# Patient Record
Sex: MALE | Race: WHITE | URBAN - METROPOLITAN AREA
[De-identification: names, ages, dates, MRNs, and addresses within clinical notes are randomized per-mention and may not be internally consistent; named-entity substitution may affect disease eponyms.]

---

## 2021-04-02 ENCOUNTER — BULK ORDERING (OUTPATIENT)
Dept: CASE MANAGEMENT | Facility: OTHER | Age: 46
End: 2021-04-02

## 2021-04-02 DIAGNOSIS — Z23 IMMUNIZATION DUE: ICD-10-CM

## 2021-04-05 ENCOUNTER — OFFICE VISIT (OUTPATIENT)
Dept: ENDOCRINOLOGY | Age: 46
End: 2021-04-05

## 2021-04-05 VITALS
WEIGHT: 248.3 LBS | DIASTOLIC BLOOD PRESSURE: 80 MMHG | SYSTOLIC BLOOD PRESSURE: 110 MMHG | BODY MASS INDEX: 35.55 KG/M2 | HEART RATE: 96 BPM | HEIGHT: 70 IN | OXYGEN SATURATION: 98 %

## 2021-04-05 DIAGNOSIS — IMO0002 DIABETES MELLITUS TYPE 2, UNCONTROLLED, WITH COMPLICATIONS: Primary | ICD-10-CM

## 2021-04-05 DIAGNOSIS — E66.01 MORBID OBESITY (HCC): ICD-10-CM

## 2021-04-05 DIAGNOSIS — E78.5 HYPERLIPIDEMIA, UNSPECIFIED HYPERLIPIDEMIA TYPE: ICD-10-CM

## 2021-04-05 PROCEDURE — 99204 OFFICE O/P NEW MOD 45 MIN: CPT | Performed by: INTERNAL MEDICINE

## 2021-04-05 RX ORDER — FLUOXETINE HYDROCHLORIDE 40 MG/1
40 CAPSULE ORAL DAILY
COMMUNITY
End: 2021-04-05

## 2021-04-05 RX ORDER — DULOXETIN HYDROCHLORIDE 60 MG/1
60 CAPSULE, DELAYED RELEASE ORAL DAILY
COMMUNITY

## 2021-04-05 RX ORDER — ASPIRIN 81 MG/1
TABLET ORAL
COMMUNITY

## 2021-04-05 RX ORDER — GEMFIBROZIL 600 MG/1
600 TABLET, FILM COATED ORAL
COMMUNITY
End: 2023-01-09 | Stop reason: ALTCHOICE

## 2021-04-05 RX ORDER — FERROUS SULFATE 325(65) MG
1 TABLET ORAL DAILY
COMMUNITY
Start: 2021-03-10

## 2021-04-05 RX ORDER — OMEPRAZOLE 40 MG/1
40 CAPSULE, DELAYED RELEASE ORAL DAILY
COMMUNITY

## 2021-04-05 RX ORDER — GLIPIZIDE 10 MG/1
10 TABLET ORAL
COMMUNITY
End: 2021-11-08 | Stop reason: SDUPTHER

## 2021-04-05 RX ORDER — LISINOPRIL 40 MG/1
40 TABLET ORAL DAILY
COMMUNITY

## 2021-04-05 RX ORDER — OMEPRAZOLE 40 MG/1
40 CAPSULE, DELAYED RELEASE ORAL EVERY MORNING
COMMUNITY
Start: 2021-03-13 | End: 2021-11-08 | Stop reason: SDUPTHER

## 2021-04-05 RX ORDER — VENLAFAXINE 75 MG/1
75 TABLET ORAL
COMMUNITY
End: 2021-04-05

## 2021-04-05 RX ORDER — ROSUVASTATIN CALCIUM 5 MG/1
5 TABLET, COATED ORAL DAILY
COMMUNITY
End: 2021-04-09

## 2021-04-05 RX ORDER — INSULIN GLARGINE 100 [IU]/ML
16 INJECTION, SOLUTION SUBCUTANEOUS 2 TIMES DAILY
COMMUNITY
Start: 2021-04-02 | End: 2021-04-29 | Stop reason: SDUPTHER

## 2021-04-05 RX ORDER — MELATONIN 10 MG
CAPSULE ORAL
COMMUNITY

## 2021-04-05 RX ORDER — LAMOTRIGINE 150 MG/1
150 TABLET ORAL DAILY
COMMUNITY

## 2021-04-05 RX ORDER — AMLODIPINE BESYLATE 5 MG/1
5 TABLET ORAL DAILY
COMMUNITY

## 2021-04-05 RX ORDER — BLOOD SUGAR DIAGNOSTIC
1 STRIP MISCELLANEOUS 3 TIMES DAILY
COMMUNITY
Start: 2021-01-31 | End: 2021-10-21 | Stop reason: SDUPTHER

## 2021-04-05 RX ORDER — ARIPIPRAZOLE 5 MG/1
5 TABLET ORAL DAILY
COMMUNITY
End: 2021-04-05

## 2021-04-05 RX ORDER — GLIPIZIDE 10 MG/1
10 TABLET, FILM COATED, EXTENDED RELEASE ORAL DAILY
COMMUNITY

## 2021-04-05 NOTE — PROGRESS NOTES
"Chief Complaint  Diabetes (testing bs 2 x day / last dm eye exam less then 1 yr ago )    Subjective          Gerard Gallagher presents to [facility] ENDOCRINOLOGY    45-year-old male with diabetes mellitus poorly controlled, hyperlipidemia, and obesity who presents to \[facility]\"" care.    Diabetes  Mr. Gallagher notes that he was first diagnosed with diabetes in 2007. He is taking Lantus 16 units twice daily, metformin 1000 mg twice daily , glipizide 10 mg po qd. Patient notes that he checks his blood sugars 3-4 times daily.  Patient notes fasting BG in the 200s. Notes that he urinates more than 2-3 times. He denies any changes in his weight. Last eye exam was less than 1 years ago. Patient notes that he has hx of diabetic retinopathy. He notes having cataract sugery for both eyes in 2019. No hx of MI/CVA. Numbness or tingling, or amputations. No hypoglycemia.  Patient is not sure of his last hemoglobin A1c.  Patient denies hypoglycemia        Hyperlipidemia  He takes Crestor 5 mg po qd and gemfibrizol  600mg once daily.  We currently do not have documentations of his last lipid levels.  He denies muscle cramps or arthralgias with Crestor and gemfibrozil on board.    Obesity  BMI 35.63.  Patient is 248 pounds.  Patient notes he has difficulty minimizing his carbohydrates.  Activity has been limited in the setting of COVID-19 pandemic.      Objective   Vital Signs:   /80 (BP Location: Right arm, Patient Position: Sitting, Cuff Size: Large Adult)   Pulse 96   Ht 177.8 cm (70\")   Wt 113 kg (248 lb 4.8 oz)   SpO2 98%   BMI 35.63 kg/m²     Physical Exam  Vitals reviewed.   Constitutional:       Appearance: Normal appearance. He is obese.   HENT:      Head: Normocephalic and atraumatic.      Nose:      Comments: Mask in place  Eyes:      Extraocular Movements: Extraocular movements intact.      Conjunctiva/sclera: Conjunctivae normal.      Pupils: Pupils are equal, round, and reactive to light.   Neck: "      Thyroid: Thyromegaly present. No thyroid mass or thyroid tenderness.   Cardiovascular:      Rate and Rhythm: Normal rate and regular rhythm.      Pulses: Normal pulses.           Dorsalis pedis pulses are 2+ on the right side and 2+ on the left side.      Heart sounds: Normal heart sounds.   Pulmonary:      Effort: Pulmonary effort is normal.      Breath sounds: Normal breath sounds.   Abdominal:      General: Abdomen is flat. Bowel sounds are normal.      Palpations: Abdomen is soft.      Comments: Pale abdominal striae   Feet:      Right foot:      Protective Sensation: 10 sites tested. 10 sites sensed.      Skin integrity: Skin integrity normal.      Toenail Condition: Right toenails are long.      Left foot:      Protective Sensation: 10 sites tested. 10 sites sensed.      Skin integrity: Skin integrity normal.      Toenail Condition: Left toenails are long.   Skin:     General: Skin is warm and dry.   Neurological:      General: No focal deficit present.      Mental Status: He is alert and oriented to person, place, and time.      Cranial Nerves: Cranial nerves are intact.      Sensory: Sensation is intact.      Deep Tendon Reflexes: Reflexes are normal and symmetric.   Psychiatric:         Mood and Affect: Mood normal.         Behavior: Behavior normal.        Result Review :   The following data was reviewed by: Joe Camarena MD on 04/05/2021:                                         Assessment and Plan    Diagnoses and all orders for this visit:    1. Diabetes mellitus type 2, uncontrolled, with complications (CMS/[facility]) (Primary)  Assessment & Plan:  Goal Hgb A1c <7  Goal fasting BG <130 mg/dl  Goal BG prior to meal <180  Previous hemoglobin A1c not documented  We will check labs today  Encourage low carb diet with no more than 45 gm of cab per meal  Patient notes fasting blood glucose levels in the 200s  As such we will increase Lantus from 16 units twice daily to 22 units twice daily  We will  continue Metformin 1000 mg twice daily we will also continue glipizide 10 mg p.o. daily  Make further modifications once labs results  Continue to encourage increase activity of 150 minutes of moderate exercise weekly    Orders:  -     Comprehensive Metabolic Panel  -     eGFR-Glomerular Filtration  -     Hemoglobin A1c  -     Lipid Panel  -     Microalbumin / Creatinine Urine Ratio - Urine, Clean Catch  -     Vitamin B12 & Folate  -     TSH  -     T4, Free    2. Hyperlipidemia, unspecified hyperlipidemia type  Assessment & Plan:  We will check a lipid panel today  Continue gemfibrozil 600 mg 1 tab daily and atorvastatin 5 mg 1 tab nightly pending labs      3. Morbid obesity (CMS/[facility])  Assessment & Plan:  Weight       04/05/21  1124   Weight: 113 kg (248 lb 4.8 oz)      BMI Body mass index is 35.63 kg/m².   Goal weight loss >/= 5% of TBW q 3 months  Encourage minimizing carbohydrates to less than 40 g of carb per meal  Encourage activity of 150 minutes of moderate activity per week  We will consider initiation of a GLP-1 agonist and an SGLT2 inhibitor which will also help with weight loss  Patient meets criteria for gastric bypass surgery  We will consider other weight loss medications first before recommending surgery          Follow Up   Return in about 3 months (around 7/5/2021).  Patient was given instructions and counseling regarding his condition or for health maintenance advice. Please see specific information pulled into the AVS if appropriate.

## 2021-04-05 NOTE — ASSESSMENT & PLAN NOTE
We will check a lipid panel today  Continue gemfibrozil 600 mg 1 tab daily and atorvastatin 5 mg 1 tab nightly pending labs

## 2021-04-05 NOTE — ASSESSMENT & PLAN NOTE
Weight       04/05/21  1124   Weight: 113 kg (248 lb 4.8 oz)      BMI Body mass index is 35.63 kg/m².   Goal weight loss >/= 5% of TBW q 3 months  Encourage minimizing carbohydrates to less than 40 g of carb per meal  Encourage activity of 150 minutes of moderate activity per week  We will consider initiation of a GLP-1 agonist and an SGLT2 inhibitor which will also help with weight loss  Patient meets criteria for gastric bypass surgery  We will consider other weight loss medications first before recommending surgery

## 2021-04-05 NOTE — ASSESSMENT & PLAN NOTE
Goal Hgb A1c <7  Goal fasting BG <130 mg/dl  Goal BG prior to meal <180  Previous hemoglobin A1c not documented  We will check labs today  Encourage low carb diet with no more than 45 gm of cab per meal  Patient notes fasting blood glucose levels in the 200s  As such we will increase Lantus from 16 units twice daily to 22 units twice daily  We will continue Metformin 1000 mg twice daily we will also continue glipizide 10 mg p.o. daily  Make further modifications once labs results  Continue to encourage increase activity of 150 minutes of moderate exercise weekly

## 2021-04-06 LAB
ALBUMIN SERPL-MCNC: 4.6 G/DL (ref 4–5)
ALBUMIN/CREAT UR: 4 MG/G CREAT (ref 0–29)
ALBUMIN/GLOB SERPL: 2 {RATIO} (ref 1.2–2.2)
ALP SERPL-CCNC: 76 IU/L (ref 39–117)
ALT SERPL-CCNC: 18 IU/L (ref 0–44)
AST SERPL-CCNC: 16 IU/L (ref 0–40)
BILIRUB SERPL-MCNC: 0.2 MG/DL (ref 0–1.2)
BUN SERPL-MCNC: 14 MG/DL (ref 6–24)
BUN/CREAT SERPL: 14 (ref 9–20)
CALCIUM SERPL-MCNC: 9.4 MG/DL (ref 8.7–10.2)
CHLORIDE SERPL-SCNC: 102 MMOL/L (ref 96–106)
CHOLEST SERPL-MCNC: 205 MG/DL (ref 100–199)
CO2 SERPL-SCNC: 22 MMOL/L (ref 20–29)
CREAT SERPL-MCNC: 1 MG/DL (ref 0.76–1.27)
CREAT UR-MCNC: 170.7 MG/DL
FOLATE SERPL-MCNC: 4 NG/ML
GLOBULIN SER CALC-MCNC: 2.3 G/DL (ref 1.5–4.5)
GLUCOSE SERPL-MCNC: 292 MG/DL (ref 65–99)
HBA1C MFR BLD: 11 % (ref 4.8–5.6)
HDLC SERPL-MCNC: 41 MG/DL
IMP & REVIEW OF LAB RESULTS: NORMAL
LDLC SERPL CALC-MCNC: 138 MG/DL (ref 0–99)
Lab: NORMAL
MICROALBUMIN UR-MCNC: 7.6 UG/ML
POTASSIUM SERPL-SCNC: 4.6 MMOL/L (ref 3.5–5.2)
PROT SERPL-MCNC: 6.9 G/DL (ref 6–8.5)
SODIUM SERPL-SCNC: 140 MMOL/L (ref 134–144)
T4 FREE SERPL-MCNC: 0.77 NG/DL (ref 0.82–1.77)
TRIGL SERPL-MCNC: 146 MG/DL (ref 0–149)
TSH SERPL DL<=0.005 MIU/L-ACNC: 1.85 UIU/ML (ref 0.45–4.5)
VIT B12 SERPL-MCNC: 381 PG/ML (ref 232–1245)
VLDLC SERPL CALC-MCNC: 26 MG/DL (ref 5–40)

## 2021-04-09 ENCOUNTER — TELEPHONE (OUTPATIENT)
Dept: ENDOCRINOLOGY | Age: 46
End: 2021-04-09

## 2021-04-09 RX ORDER — ROSUVASTATIN CALCIUM 10 MG/1
10 TABLET, COATED ORAL NIGHTLY
Qty: 30 TABLET | Refills: 11 | Status: SHIPPED | OUTPATIENT
Start: 2021-04-09 | End: 2021-11-09

## 2021-04-09 NOTE — TELEPHONE ENCOUNTER
Patient was called and notified that his hemoglobin A1c was above goal  Patient was also noted that his LDL was above goal  We will increase patient's Crestor to 10 mg 1 tab nightly  Patient is supposed to be taking Lantus 22 units twice daily secondary to our discussion post his first visit however patient is currently only taking Lantus 16 units twice daily  Concerned regarding his A1c that the patient may need mealtime insulin  We will try to initiate GLP-1 therapy first prior to deciding whether the patient should be started on fast acting insulin with meals.

## 2021-04-09 NOTE — TELEPHONE ENCOUNTER
Maty Harper MD   PCP - General, Internal Medicine   Since 4/5/2021   [phone]       Medical records requested from PCP

## 2021-04-09 NOTE — TELEPHONE ENCOUNTER
----- Message from Radha Camarena MD sent at 4/5/2021  6:43 PM EDT -----  Regarding: Labs  Please get the patien't most recent labs from His PCP     Maty Harper MD    PCP - General, Internal Medicine    Since 4/5/2021    [phone]

## 2021-04-29 RX ORDER — INSULIN GLARGINE 100 [IU]/ML
INJECTION, SOLUTION SUBCUTANEOUS
Qty: 30 ML | Refills: 3 | Status: SHIPPED | OUTPATIENT
Start: 2021-04-29 | End: 2021-10-21

## 2021-04-29 NOTE — TELEPHONE ENCOUNTER
Patient is needing refills on   Lantus SoloStar 100 UNIT/ML injection pen  (22 units twice daily)      Tresbia flextouch 100 unit/ml  (22 units once a day)       Sent to     [id number] - [place], [place] - [address] AT [facility] - [phone]  - [phone] FX   Phone:  [phone]   Fax:  [phone]

## 2021-10-08 RX ORDER — INSULIN GLARGINE 100 [IU]/ML
INJECTION, SOLUTION SUBCUTANEOUS
Qty: 30 ML | Refills: 3 | OUTPATIENT
Start: 2021-10-08

## 2021-10-21 ENCOUNTER — TELEPHONE (OUTPATIENT)
Dept: ENDOCRINOLOGY | Age: 46
End: 2021-10-21

## 2021-10-21 RX ORDER — BLOOD SUGAR DIAGNOSTIC
STRIP MISCELLANEOUS
Qty: 120 EACH | Refills: 0 | Status: SHIPPED | OUTPATIENT
Start: 2021-10-21 | End: 2021-10-25

## 2021-10-21 RX ORDER — INSULIN GLARGINE 100 [IU]/ML
INJECTION, SOLUTION SUBCUTANEOUS
Qty: 15 ML | Refills: 0 | Status: SHIPPED | OUTPATIENT
Start: 2021-10-21 | End: 2021-12-27

## 2021-10-21 RX ORDER — INSULIN GLARGINE 100 [IU]/ML
INJECTION, SOLUTION SUBCUTANEOUS
Qty: 15 ML | Refills: 0 | Status: SHIPPED | OUTPATIENT
Start: 2021-10-21 | End: 2021-10-21 | Stop reason: SDUPTHER

## 2021-10-21 NOTE — TELEPHONE ENCOUNTER
Patient called    Patient needs his Lantus and test strips refilled. He is almost out of the medications    [facility] [address]    Patient asks for a status update

## 2021-10-25 RX ORDER — BLOOD SUGAR DIAGNOSTIC
STRIP MISCELLANEOUS
Qty: 300 EACH | Refills: 3 | Status: SHIPPED | OUTPATIENT
Start: 2021-10-25

## 2021-11-08 ENCOUNTER — OFFICE VISIT (OUTPATIENT)
Dept: ENDOCRINOLOGY | Age: 46
End: 2021-11-08

## 2021-11-08 VITALS
SYSTOLIC BLOOD PRESSURE: 124 MMHG | RESPIRATION RATE: 20 BRPM | WEIGHT: 256.2 LBS | HEIGHT: 70 IN | OXYGEN SATURATION: 98 % | DIASTOLIC BLOOD PRESSURE: 76 MMHG | HEART RATE: 77 BPM | BODY MASS INDEX: 36.68 KG/M2

## 2021-11-08 DIAGNOSIS — IMO0002 DIABETES MELLITUS TYPE 2, UNCONTROLLED, WITH COMPLICATIONS: Primary | ICD-10-CM

## 2021-11-08 DIAGNOSIS — I10 ESSENTIAL HYPERTENSION: ICD-10-CM

## 2021-11-08 DIAGNOSIS — E78.5 HYPERLIPIDEMIA, UNSPECIFIED HYPERLIPIDEMIA TYPE: ICD-10-CM

## 2021-11-08 PROCEDURE — 99214 OFFICE O/P EST MOD 30 MIN: CPT | Performed by: NURSE PRACTITIONER

## 2021-11-08 RX ORDER — SEMAGLUTIDE 1.34 MG/ML
.25-.5 INJECTION, SOLUTION SUBCUTANEOUS WEEKLY
Qty: 4 PEN | Refills: 1 | Status: SHIPPED | OUTPATIENT
Start: 2021-11-08 | End: 2022-09-26

## 2021-11-08 NOTE — PROGRESS NOTES
"Chief Complaint  Chief Complaint   Patient presents with   • Diabetes     FUP DM2 CHYECKS BS DAILY AVG  LAST EYE EXAM 08/2021   • Short Stature       Subjective          History of Present Illness    Gerard Gallagher 46 y.o. presents for a follow-up evaluation for type 2 DM    He has been diabetic since 2007    Pt is on the following medications for their DM: glipizide 10 mg, Lantus 22 units BID and metformin 1,000 mg BID      Denies  diarrhea or constipation, difficulty breathing,chest pain, palpitations, vision changes, numbness and tingling in feet/hands.    Pt does have a history of DM retinopathy.  Last eye exam was 8/7/21  Bilateral cataract surgery in 2019.    Pt does not have a history of nephropathy.  Patient is currently taking ACE    Pt does not have neuropathy.     Pt does not have a history of CAD or CVA.    Last A1C in 04/21 was 11.0    Last microalbumin in 04/21 was negative      Blood Sugars    Blood glucoses are checked 1/day.    Fasting blood glucoses: 230s    Pt has no episodes of hypoglycemia.    Patient is not physical active.          Hyperlipidemia     Pt denies any muscle/body aches, chest pain, or shortness of breath    Pt is currently taking crestor 10 mg HS and gemfibrozil 600 mg daily    Last lipid panel in 04/21 showed Total 205, Triglycerides 146, HDL 41 and           Hypertension    Pt denies any chest pain, palpitations, shortness of breath, or headache    Current regimen includes norvasc 5 mg daily and lisinopril 40 mg daily          I have reviewed the patient's allergies, medicines, past medical hx, family hx and social hx.    Objective   Vital Signs:   /76 (BP Location: Left arm, Patient Position: Sitting, Cuff Size: Large Adult)   Pulse 77   Resp 20   Ht 177.8 cm (70\")   Wt 116 kg (256 lb 3.2 oz)   SpO2 98%   BMI 36.76 kg/m²       Physical Exam   Physical Exam  Constitutional:       General: He is not in acute distress.     Appearance: Normal appearance. He " is obese. He is not diaphoretic.   HENT:      Head: Normocephalic and atraumatic.   Eyes:      General:         Right eye: No discharge.         Left eye: No discharge.   Cardiovascular:      Rate and Rhythm: Normal rate and regular rhythm.      Heart sounds: Normal heart sounds. No murmur heard.  No friction rub. No gallop.    Pulmonary:      Effort: Pulmonary effort is normal. No respiratory distress.      Breath sounds: Normal breath sounds. No wheezing.   Skin:     General: Skin is warm and dry.   Neurological:      Mental Status: He is alert.   Psychiatric:         Mood and Affect: Mood normal.         Behavior: Behavior normal.                    Results Review:   Hemoglobin A1C   Date Value Ref Range Status   04/05/2021 11.0 (H) 4.8 - 5.6 % Final     Comment:              Prediabetes: 5.7 - 6.4           Diabetes: >6.4           Glycemic control for adults with diabetes: <7.0       Triglycerides   Date Value Ref Range Status   04/05/2021 146 0 - 149 mg/dL Final     HDL Cholesterol   Date Value Ref Range Status   04/05/2021 41 >39 mg/dL Final     LDL Chol Calc (NIH)   Date Value Ref Range Status   04/05/2021 138 (H) 0 - 99 mg/dL Final     VLDL Cholesterol Ruben   Date Value Ref Range Status   04/05/2021 26 5 - 40 mg/dL Final         Assessment and Plan {CC Problem List  Visit Diagnosis  ROS  Review (Popup)  Health Maintenance  Quality  BestPractice  Medications  SmartSets  SnapShot Encounters  Media :23  Diagnoses and all orders for this visit:    1. Diabetes mellitus type 2, uncontrolled, with complications (HCC) (Primary)  -     Comprehensive Metabolic Panel  -     Hemoglobin A1c  -     Semaglutide,0.25 or 0.5MG/DOS, (Ozempic, 0.25 or 0.5 MG/DOSE,) 2 MG/1.5ML solution pen-injector; Inject 0.25-0.5 mg under the skin into the appropriate area as directed 1 (One) Time Per Week. 0.25 mg once a week for 4 weeks then go to 0.5 mg once a week  Dispense: 4 pen; Refill: 1    Increase to 46 units daily of  Lanuts - would like to take daily instead of BID  Start GLP1  No history of pancreatitis and no personal or family history of thyroid cancer  Advised to increase BG checks - doesn't want CGM at this time      2. Hyperlipidemia, unspecified hyperlipidemia type  -     Comprehensive Metabolic Panel  -     Lipid Panel    Check labs today  Continue with statin and gemfibrozil      3. Essential hypertension  -     Comprehensive Metabolic Panel    Stable  Continue with current medication regimen  Monitor        No refills needed at this time    Doesn't use Mychart      RTC in 3 months      Follow Up     Patient was given instructions and counseling regarding her condition or for health maintenance advice. Please see specific information pulled into the AVS if appropriate.              Esme Amin, APRN  11/08/21

## 2021-11-09 DIAGNOSIS — E78.5 HYPERLIPIDEMIA, UNSPECIFIED HYPERLIPIDEMIA TYPE: Primary | ICD-10-CM

## 2021-11-09 LAB
ALBUMIN SERPL-MCNC: 4.7 G/DL (ref 4–5)
ALBUMIN/GLOB SERPL: 1.8 {RATIO} (ref 1.2–2.2)
ALP SERPL-CCNC: 91 IU/L (ref 44–121)
ALT SERPL-CCNC: 21 IU/L (ref 0–44)
AST SERPL-CCNC: 15 IU/L (ref 0–40)
BILIRUB SERPL-MCNC: 0.2 MG/DL (ref 0–1.2)
BUN SERPL-MCNC: 18 MG/DL (ref 6–24)
BUN/CREAT SERPL: 18 (ref 9–20)
CALCIUM SERPL-MCNC: 10.2 MG/DL (ref 8.7–10.2)
CHLORIDE SERPL-SCNC: 98 MMOL/L (ref 96–106)
CHOLEST SERPL-MCNC: 243 MG/DL (ref 100–199)
CO2 SERPL-SCNC: 21 MMOL/L (ref 20–29)
CREAT SERPL-MCNC: 0.98 MG/DL (ref 0.76–1.27)
GLOBULIN SER CALC-MCNC: 2.6 G/DL (ref 1.5–4.5)
GLUCOSE SERPL-MCNC: 260 MG/DL (ref 65–99)
HBA1C MFR BLD: 11.8 % (ref 4.8–5.6)
HDLC SERPL-MCNC: 37 MG/DL
IMP & REVIEW OF LAB RESULTS: NORMAL
LDLC SERPL CALC-MCNC: 160 MG/DL (ref 0–99)
POTASSIUM SERPL-SCNC: 4.5 MMOL/L (ref 3.5–5.2)
PROT SERPL-MCNC: 7.3 G/DL (ref 6–8.5)
SODIUM SERPL-SCNC: 137 MMOL/L (ref 134–144)
TRIGL SERPL-MCNC: 249 MG/DL (ref 0–149)
VLDLC SERPL CALC-MCNC: 46 MG/DL (ref 5–40)

## 2021-11-09 RX ORDER — ROSUVASTATIN CALCIUM 10 MG/1
20 TABLET, COATED ORAL NIGHTLY
Qty: 30 TABLET | Refills: 5 | Status: SHIPPED | OUTPATIENT
Start: 2021-11-09 | End: 2022-10-07 | Stop reason: DRUGHIGH

## 2021-11-09 NOTE — PROGRESS NOTES
Called and talked to patient-    A1C 11 - patient has increased lantus.  He has not started ozempic, but I just completed PA.  Advised that if he has any trouble getting it then to let me know.    Cholesterol is elevated, therefore we will increase crestor to 20 mg

## 2021-12-27 RX ORDER — INSULIN GLARGINE 100 [IU]/ML
INJECTION, SOLUTION SUBCUTANEOUS
Qty: 15 ML | Refills: 2 | Status: SHIPPED | OUTPATIENT
Start: 2021-12-27 | End: 2022-04-11

## 2022-04-11 RX ORDER — INSULIN GLARGINE 100 [IU]/ML
INJECTION, SOLUTION SUBCUTANEOUS
Qty: 45 ML | Refills: 1 | Status: SHIPPED | OUTPATIENT
Start: 2022-04-11 | End: 2022-11-01

## 2022-07-01 RX ORDER — SEMAGLUTIDE 1.34 MG/ML
INJECTION, SOLUTION SUBCUTANEOUS
Qty: 6 ML | OUTPATIENT
Start: 2022-07-01

## 2022-07-01 NOTE — TELEPHONE ENCOUNTER
Rx Refill Note  Requested Prescriptions     Pending Prescriptions Disp Refills    Ozempic, 0.25 or 0.5 MG/DOSE, 2 MG/1.5ML solution pen-injector [Pharmacy Med Name: OZEMPIC 0.25 OR 0.5MG/DOS 1X2MG PEN] 6 mL      Sig: INJECT 0.25- 0.5MG UNDER THE SKIN INTO THE APPROPRIATE AREA AS DIRECTED ONCE WEEKLY. 0.25 MG ONCE A WEEK FOR 4 WEEKS THEN GO TO 0.5MG ONCE WEEKLY      Last office visit with prescribing clinician: 11/8/2021      Next office visit with prescribing clinician: Visit date not found            Katie Palomares  07/01/22, 13:50 EDT

## 2022-07-19 RX ORDER — SEMAGLUTIDE 1.34 MG/ML
INJECTION, SOLUTION SUBCUTANEOUS
Qty: 6 ML | OUTPATIENT
Start: 2022-07-19

## 2022-07-19 NOTE — TELEPHONE ENCOUNTER
Rx Refill Note  Requested Prescriptions     Pending Prescriptions Disp Refills    Ozempic, 0.25 or 0.5 MG/DOSE, 2 MG/1.5ML solution pen-injector [Pharmacy Med Name: OZEMPIC 0.25 OR 0.5MG/DOS 1X2MG PEN] 6 mL      Sig: INJECT 0.25- 0.5MG UNDER THE SKIN INTO THE APPROPRIATE AREA AS DIRECTED ONCE WEEKLY. 0.25 MG ONCE A WEEK FOR 4 WEEKS THEN GO TO 0.5MG ONCE WEEKLY      Last office visit with prescribing clinician: 11/8/2021      Next office visit with prescribing clinician: Visit date not found            JAY HERNDON MA  07/19/22, 08:52 EDT

## 2022-09-26 RX ORDER — SEMAGLUTIDE 1.34 MG/ML
0.5 INJECTION, SOLUTION SUBCUTANEOUS WEEKLY
Qty: 6 ML | Refills: 0 | Status: SHIPPED | OUTPATIENT
Start: 2022-09-26 | End: 2022-10-26

## 2022-10-04 PROBLEM — E11.65 UNCONTROLLED TYPE 2 DIABETES MELLITUS WITH HYPERGLYCEMIA (HCC): Status: ACTIVE | Noted: 2021-04-05

## 2022-10-04 NOTE — PROGRESS NOTES
Chief Complaint  Chief Complaint   Patient presents with   • Diabetes     Type 2 Did not bring meter   BS this morning : 285  Lowest BS: 186  Highest BS: 285     Up to date on eye exam   Diabetic neuropathy/nephropathy: x       Subjective          History of Present Illness    Gerard Gallagher 47 y.o. presents for a follow-up evaluation for type 2 DM  Hasn't been seen since 11/2021     He has been diabetic since 2007     Pt is on the following medications for their DM: glipizide ER 10 mg daily, Lantus 46 units daily and  metformin 1,000 mg BID     Ozempic 0.25 mg  Weekly - just started back has taken 2 doses      Denies diarrhea, constipation, chest pain, shortness of breath, vision changes or numbness and tingling in feet/hands.    Weight stable since last visit.    Pt does have a history of DM retinopathy.  Last eye exam was 09/22  Bilateral cataract surgery in 2019.     Pt does not have a history of nephropathy.  Patient is currently taking ACE     Pt does not have neuropathy.      Pt does not have a history of CAD or CVA.    Last A1C in 11/21 was 11.8    Last microalbumin in 04/21 was negative          Blood Sugars    Blood glucoses are checked 1/day.    Fasting blood glucoses: 186-285    Pt has no episodes of hypoglycemia.      Refused CGM at 11/21 visit          Hyperlipidemia     Pt denies any muscle/body aches, chest pain, or shortness of breath    Pt is currently taking crestor 10 mg HS and gemfibrozil 600 mg daily    Last lipid panel in 11/21 showed Total 243, Triglycerides 249, HDL 37 and             Hypertension    Pt denies any chest pain, palpitations, shortness of breath, or headache    Current regimen includes norvasc 5 mg daily and lisinopril 40 mg daily            I have reviewed the patient's allergies, medicines, past medical hx, family hx and social hx.    Objective   Vital Signs:   /80 (BP Location: Left arm, Patient Position: Sitting, Cuff Size: Large Adult)   Pulse 100   Temp 96.2  "°F (35.7 °C) (Temporal)   Ht 177.8 cm (70\")   Wt 116 kg (255 lb 3.2 oz)   SpO2 98%   BMI 36.62 kg/m²       Physical Exam   Physical Exam  Constitutional:       General: He is not in acute distress.     Appearance: Normal appearance. He is not diaphoretic.   HENT:      Head: Normocephalic and atraumatic.   Eyes:      General:         Right eye: No discharge.         Left eye: No discharge.   Skin:     General: Skin is warm and dry.   Neurological:      Mental Status: He is alert.   Psychiatric:         Mood and Affect: Mood normal.         Behavior: Behavior normal.                    Results Review:   Hemoglobin A1C   Date Value Ref Range Status   11/08/2021 11.8 (H) 4.8 - 5.6 % Final     Comment:              Prediabetes: 5.7 - 6.4           Diabetes: >6.4           Glycemic control for adults with diabetes: <7.0       Triglycerides   Date Value Ref Range Status   11/08/2021 249 (H) 0 - 149 mg/dL Final     HDL Cholesterol   Date Value Ref Range Status   11/08/2021 37 (L) >39 mg/dL Final     LDL Chol Calc (NIH)   Date Value Ref Range Status   11/08/2021 160 (H) 0 - 99 mg/dL Final     VLDL Cholesterol Ruben   Date Value Ref Range Status   11/08/2021 46 (H) 5 - 40 mg/dL Final         Assessment and Plan {CC Problem List  Visit Diagnosis  ROS  Review (Popup)  Health Maintenance  Quality  BestPractice  Medications  SmartSets  SnapShot Encounters  Media :23  Diagnoses and all orders for this visit:    1. Uncontrolled type 2 diabetes mellitus with hyperglycemia (HCC) (Primary)  -     Hemoglobin A1c  -     Comprehensive Metabolic Panel  -     Microalbumin / Creatinine Urine Ratio - Urine, Clean Catch    Increase Lantus 52 units daily   Continue with glipizide ER 10 mg daily  Continue with metformin 1,000 mg BID   Ozempic 0.25 mg  Weekly for a total of 4 weeks then go to 0.5 mg weekly  Check blood sugars in the morning when you get up and then again during the day  Check labs      2. Hyperlipidemia, " unspecified hyperlipidemia type  -     Comprehensive Metabolic Panel  -     Lipid Panel    Check labs today  Continue with statin and gemfibrozil      3. Essential hypertension  -     Comprehensive Metabolic Panel    Manual recheck was 124/80  Continue with current medication regimen  Defer management to PCP          No refills needed at this time      Labs today  RTC in 3 months with me      Follow Up     Patient was given instructions and counseling regarding her condition or for health maintenance advice. Please see specific information pulled into the AVS if appropriate.              Esme Amin, CATA  10/06/22

## 2022-10-06 ENCOUNTER — OFFICE VISIT (OUTPATIENT)
Dept: ENDOCRINOLOGY | Age: 47
End: 2022-10-06

## 2022-10-06 VITALS
TEMPERATURE: 96.2 F | SYSTOLIC BLOOD PRESSURE: 124 MMHG | BODY MASS INDEX: 36.54 KG/M2 | OXYGEN SATURATION: 98 % | HEIGHT: 70 IN | WEIGHT: 255.2 LBS | DIASTOLIC BLOOD PRESSURE: 80 MMHG | HEART RATE: 100 BPM

## 2022-10-06 DIAGNOSIS — E11.65 UNCONTROLLED TYPE 2 DIABETES MELLITUS WITH HYPERGLYCEMIA: Primary | ICD-10-CM

## 2022-10-06 DIAGNOSIS — I10 ESSENTIAL HYPERTENSION: ICD-10-CM

## 2022-10-06 DIAGNOSIS — E78.5 HYPERLIPIDEMIA, UNSPECIFIED HYPERLIPIDEMIA TYPE: ICD-10-CM

## 2022-10-06 PROCEDURE — 99214 OFFICE O/P EST MOD 30 MIN: CPT | Performed by: NURSE PRACTITIONER

## 2022-10-06 RX ORDER — BUPROPION HYDROCHLORIDE 150 MG/1
TABLET, EXTENDED RELEASE ORAL
COMMUNITY
Start: 2022-09-09

## 2022-10-06 NOTE — PATIENT INSTRUCTIONS
Increase Lantus 52 units daily   Continue with glipizide ER 10 mg daily  Continue with metformin 1,000 mg BID   Ozempic 0.25 mg  Weekly for a total of 4 weeks then go to 0.5 mg weekly  Check blood sugars in the morning when you get up and then again during the day

## 2022-10-07 DIAGNOSIS — E11.65 UNCONTROLLED TYPE 2 DIABETES MELLITUS WITH HYPERGLYCEMIA: Primary | ICD-10-CM

## 2022-10-07 DIAGNOSIS — E78.5 HYPERLIPIDEMIA, UNSPECIFIED HYPERLIPIDEMIA TYPE: ICD-10-CM

## 2022-10-07 LAB
ALBUMIN SERPL-MCNC: 4.9 G/DL (ref 3.5–5.2)
ALBUMIN/CREAT UR: 4 MG/G CREAT (ref 0–29)
ALBUMIN/GLOB SERPL: 2.7 G/DL
ALP SERPL-CCNC: 93 U/L (ref 39–117)
ALT SERPL-CCNC: 18 U/L (ref 1–41)
AST SERPL-CCNC: 11 U/L (ref 1–40)
BILIRUB SERPL-MCNC: <0.2 MG/DL (ref 0–1.2)
BUN SERPL-MCNC: 15 MG/DL (ref 6–20)
BUN/CREAT SERPL: 14.4 (ref 7–25)
CALCIUM SERPL-MCNC: 9.8 MG/DL (ref 8.6–10.5)
CHLORIDE SERPL-SCNC: 97 MMOL/L (ref 98–107)
CHOLEST SERPL-MCNC: 230 MG/DL (ref 0–200)
CO2 SERPL-SCNC: 25.4 MMOL/L (ref 22–29)
CREAT SERPL-MCNC: 1.04 MG/DL (ref 0.76–1.27)
CREAT UR-MCNC: 74 MG/DL
EGFRCR SERPLBLD CKD-EPI 2021: 88 ML/MIN/1.73
GLOBULIN SER CALC-MCNC: 1.8 GM/DL
GLUCOSE SERPL-MCNC: 362 MG/DL (ref 65–99)
HBA1C MFR BLD: 11.4 % (ref 4.8–5.6)
HDLC SERPL-MCNC: 44 MG/DL (ref 40–60)
IMP & REVIEW OF LAB RESULTS: NORMAL
LDLC SERPL CALC-MCNC: 125 MG/DL (ref 0–100)
MICROALBUMIN UR-MCNC: 3.2 UG/ML
POTASSIUM SERPL-SCNC: 4.9 MMOL/L (ref 3.5–5.2)
PROT SERPL-MCNC: 6.7 G/DL (ref 6–8.5)
SODIUM SERPL-SCNC: 139 MMOL/L (ref 136–145)
TRIGL SERPL-MCNC: 344 MG/DL (ref 0–150)
VLDLC SERPL CALC-MCNC: 61 MG/DL (ref 5–40)

## 2022-10-07 RX ORDER — INSULIN ASPART 100 [IU]/ML
3 INJECTION, SOLUTION INTRAVENOUS; SUBCUTANEOUS
Qty: 3 ML | Refills: 2 | Status: SHIPPED | OUTPATIENT
Start: 2022-10-07

## 2022-10-07 RX ORDER — ROSUVASTATIN CALCIUM 40 MG/1
40 TABLET, COATED ORAL NIGHTLY
Qty: 90 TABLET | Refills: 1 | Status: SHIPPED | OUTPATIENT
Start: 2022-10-07

## 2022-10-27 ENCOUNTER — TELEPHONE (OUTPATIENT)
Dept: ENDOCRINOLOGY | Age: 47
End: 2022-10-27

## 2022-11-01 RX ORDER — INSULIN GLARGINE 100 [IU]/ML
INJECTION, SOLUTION SUBCUTANEOUS
Qty: 45 ML | Refills: 1 | Status: SHIPPED | OUTPATIENT
Start: 2022-11-01

## 2023-01-06 ENCOUNTER — OFFICE VISIT (OUTPATIENT)
Dept: ENDOCRINOLOGY | Age: 48
End: 2023-01-06
Payer: COMMERCIAL

## 2023-01-06 VITALS
HEART RATE: 114 BPM | WEIGHT: 248.2 LBS | DIASTOLIC BLOOD PRESSURE: 90 MMHG | SYSTOLIC BLOOD PRESSURE: 130 MMHG | OXYGEN SATURATION: 98 % | HEIGHT: 70 IN | BODY MASS INDEX: 35.53 KG/M2 | TEMPERATURE: 96.2 F

## 2023-01-06 DIAGNOSIS — E11.65 UNCONTROLLED TYPE 2 DIABETES MELLITUS WITH HYPERGLYCEMIA: Primary | ICD-10-CM

## 2023-01-06 DIAGNOSIS — E78.5 HYPERLIPIDEMIA, UNSPECIFIED HYPERLIPIDEMIA TYPE: ICD-10-CM

## 2023-01-06 DIAGNOSIS — I10 ESSENTIAL HYPERTENSION: ICD-10-CM

## 2023-01-06 LAB
ALBUMIN SERPL-MCNC: 5.1 G/DL (ref 3.5–5.2)
ALBUMIN/GLOB SERPL: 2.4 G/DL
ALP SERPL-CCNC: 88 U/L (ref 39–117)
ALT SERPL-CCNC: 14 U/L (ref 1–41)
AST SERPL-CCNC: 13 U/L (ref 1–40)
BILIRUB SERPL-MCNC: 0.3 MG/DL (ref 0–1.2)
BUN SERPL-MCNC: 17 MG/DL (ref 6–20)
BUN/CREAT SERPL: 16.2 (ref 7–25)
CALCIUM SERPL-MCNC: 10.3 MG/DL (ref 8.6–10.5)
CHLORIDE SERPL-SCNC: 98 MMOL/L (ref 98–107)
CHOLEST SERPL-MCNC: 308 MG/DL (ref 0–200)
CO2 SERPL-SCNC: 26.4 MMOL/L (ref 22–29)
CREAT SERPL-MCNC: 1.05 MG/DL (ref 0.76–1.27)
EGFRCR SERPLBLD CKD-EPI 2021: 87 ML/MIN/1.73
GLOBULIN SER CALC-MCNC: 2.1 GM/DL
GLUCOSE SERPL-MCNC: 273 MG/DL (ref 65–99)
HBA1C MFR BLD: 9 % (ref 4.8–5.6)
HDLC SERPL-MCNC: 41 MG/DL (ref 40–60)
IMP & REVIEW OF LAB RESULTS: NORMAL
LDLC SERPL CALC-MCNC: 221 MG/DL (ref 0–100)
POTASSIUM SERPL-SCNC: 4.8 MMOL/L (ref 3.5–5.2)
PROT SERPL-MCNC: 7.2 G/DL (ref 6–8.5)
SODIUM SERPL-SCNC: 138 MMOL/L (ref 136–145)
TRIGL SERPL-MCNC: 229 MG/DL (ref 0–150)
VLDLC SERPL CALC-MCNC: 46 MG/DL (ref 5–40)

## 2023-01-06 PROCEDURE — 99214 OFFICE O/P EST MOD 30 MIN: CPT | Performed by: NURSE PRACTITIONER

## 2023-01-06 RX ORDER — SEMAGLUTIDE 1.34 MG/ML
INJECTION, SOLUTION SUBCUTANEOUS
COMMUNITY
Start: 2022-12-08 | End: 2023-01-06 | Stop reason: DRUGHIGH

## 2023-01-06 RX ORDER — SEMAGLUTIDE 1.34 MG/ML
1 INJECTION, SOLUTION SUBCUTANEOUS WEEKLY
Qty: 9 ML | Refills: 1 | Status: SHIPPED | OUTPATIENT
Start: 2023-01-06

## 2023-01-06 NOTE — PATIENT INSTRUCTIONS
Continue with  glipizide ER 10 mg daily and metformin 1,000 mg BID  Increase Lantus 56 units daily,   Need to start taking Novolog 3 units before each meal  Increase Ozempic 1 mg  Weekly  Check blood sugars in the morning when you get up, before lunch and before dinner - bring to next appointment

## 2023-01-06 NOTE — PROGRESS NOTES
Chief Complaint  Chief Complaint   Patient presents with   • Diabetes     Type 2: Pt doesn't have meter, doesn't check bs regularly, is up to date on eye exam, no hx of retinopathy or neuropathy.        Subjective          History of Present Illness    Gerard Gallagher 47 y.o. presents for a follow-up evaluation for type 2 DM  Hasn't been seen since 11/2021     He has been diabetic since 2007     Pt is on the following medications for their DM: glipizide ER 10 mg daily, Lantus 52 units daily, Novolog 3 units before each meal, metformin 1,000 mg BID  and Ozempic 0.5 mg  Weekly     Miss Novolog almost daily      Denies diarrhea, constipation, chest pain, shortness of breath, vision changes or numbness and tingling in feet/hands.    Weight gain of 23 lbs since last visit.    Pt does have a history of DM retinopathy.  Last eye exam was 09/22  Bilateral cataract surgery in 2019.     Pt does not have a history of nephropathy.  Patient is currently taking ACE     Pt does not have neuropathy.      Pt does not have a history of CAD or CVA.    Last A1C in 10/22 was 11.40    Last microalbumin in 10/22 was negative          Blood Sugars    Blood glucoses are checked not really checked.    Fasting blood glucoses: 197, 160    Pt has no episodes of hypoglycemia.            Hyperlipidemia     Pt denies any muscle/body aches, chest pain, or shortness of breath    Pt is currently taking crestor 40 mg HS and gemfibrozil 600 mg daily    Last lipid panel in 10/22 showed Total 230, Triglycerides 344, HDL 44 and             Hypertension    Pt denies any chest pain, palpitations, shortness of breath, or headache    Current regimen includes norvasc 5 mg daily and lisinopril 40 mg daily            I have reviewed the patient's allergies, medicines, past medical hx, family hx and social hx.    Objective   Vital Signs:   /90   Pulse 114   Temp 96.2 °F (35.7 °C) (Temporal)   Ht 177.8 cm (70\")   Wt 113 kg (248 lb 3.2 oz)   SpO2 98%    BMI 35.61 kg/m²       Physical Exam   Physical Exam  Constitutional:       General: He is not in acute distress.     Appearance: Normal appearance. He is obese. He is not diaphoretic.   HENT:      Head: Normocephalic and atraumatic.   Eyes:      General:         Right eye: No discharge.         Left eye: No discharge.   Skin:     General: Skin is warm and dry.   Neurological:      Mental Status: He is alert.   Psychiatric:         Mood and Affect: Mood normal.         Behavior: Behavior normal.                    Results Review:   Hemoglobin A1C   Date Value Ref Range Status   10/06/2022 11.40 (H) 4.80 - 5.60 % Final     Comment:     Hemoglobin A1C Ranges:  Increased Risk for Diabetes  5.7% to 6.4%  Diabetes                     >= 6.5%  Diabetic Goal                < 7.0%       Triglycerides   Date Value Ref Range Status   10/06/2022 344 (H) 0 - 150 mg/dL Final     HDL Cholesterol   Date Value Ref Range Status   10/06/2022 44 40 - 60 mg/dL Final     LDL Chol Calc (NIH)   Date Value Ref Range Status   10/06/2022 125 (H) 0 - 100 mg/dL Final     VLDL Cholesterol Ruben   Date Value Ref Range Status   10/06/2022 61 (H) 5 - 40 mg/dL Final         Assessment and Plan {CC Problem List  Visit Diagnosis  ROS  Review (Popup)  Health Maintenance  Quality  BestPractice  Medications  SmartSets  SnapShot Encounters  Media :23  Diagnoses and all orders for this visit:    1. Uncontrolled type 2 diabetes mellitus with hyperglycemia (HCC) (Primary)  -     Hemoglobin A1c  -     Comprehensive Metabolic Panel  -     Semaglutide, 1 MG/DOSE, (Ozempic, 1 MG/DOSE,) 4 MG/3ML solution pen-injector; Inject 1 mg under the skin into the appropriate area as directed 1 (One) Time Per Week.  Dispense: 9 mL; Refill: 1    Continue with  glipizide ER 10 mg daily and metformin 1,000 mg BID  Increase Lantus 56 units daily,   Need to start taking Novolog 3 units before each meal - advised to leave it out on the table were he eats to help  remind him.  He states that he doesn't eat at the same time every therefore alarms would not help.  Increase Ozempic 1 mg  Weekly  Check blood sugars in the morning when you get up, before lunch and before dinner - bring to next appointment - talked about CGMs and patient is willing to try and chose Dexcom  Check labs today        2. Hyperlipidemia, unspecified hyperlipidemia type  -     Comprehensive Metabolic Panel  -     Lipid Panel    Check labs today  Continue with statin and gemfibrozil      3. Essential hypertension  -     Comprehensive Metabolic Panel    Continue with current medication regimen  Defer management to PCP          No refills needed at this time      Labs today  RTC in 3 months with me      Follow Up     Patient was given instructions and counseling regarding her condition or for health maintenance advice. Please see specific information pulled into the AVS if appropriate.              Esme Amin, CATA  01/06/23

## 2023-01-09 DIAGNOSIS — E78.5 HYPERLIPIDEMIA, UNSPECIFIED HYPERLIPIDEMIA TYPE: Primary | ICD-10-CM

## 2023-01-09 RX ORDER — EZETIMIBE 10 MG/1
10 TABLET ORAL DAILY
Qty: 90 TABLET | Refills: 1 | Status: SHIPPED | OUTPATIENT
Start: 2023-01-09

## 2023-04-19 RX ORDER — INSULIN GLARGINE 100 [IU]/ML
56 INJECTION, SOLUTION SUBCUTANEOUS DAILY
Qty: 51 ML | Refills: 1 | Status: SHIPPED | OUTPATIENT
Start: 2023-04-19 | End: 2023-07-18

## 2023-04-28 RX ORDER — INSULIN GLARGINE 100 [IU]/ML
56 INJECTION, SOLUTION SUBCUTANEOUS DAILY
Qty: 51 ML | Refills: 1 | Status: SHIPPED | OUTPATIENT
Start: 2023-04-28

## 2023-05-01 RX ORDER — BLOOD SUGAR DIAGNOSTIC
STRIP MISCELLANEOUS
Qty: 300 EACH | Refills: 3 | Status: SHIPPED | OUTPATIENT
Start: 2023-05-01

## 2023-05-02 ENCOUNTER — OFFICE VISIT (OUTPATIENT)
Dept: ENDOCRINOLOGY | Age: 48
End: 2023-05-02
Payer: COMMERCIAL

## 2023-05-02 VITALS
BODY MASS INDEX: 35.13 KG/M2 | TEMPERATURE: 97.1 F | HEART RATE: 93 BPM | WEIGHT: 245.4 LBS | OXYGEN SATURATION: 94 % | SYSTOLIC BLOOD PRESSURE: 120 MMHG | DIASTOLIC BLOOD PRESSURE: 80 MMHG | HEIGHT: 70 IN

## 2023-05-02 DIAGNOSIS — E78.5 HYPERLIPIDEMIA, UNSPECIFIED HYPERLIPIDEMIA TYPE: ICD-10-CM

## 2023-05-02 DIAGNOSIS — E11.65 UNCONTROLLED TYPE 2 DIABETES MELLITUS WITH HYPERGLYCEMIA: Primary | ICD-10-CM

## 2023-05-02 DIAGNOSIS — I10 ESSENTIAL HYPERTENSION: ICD-10-CM

## 2023-05-02 RX ORDER — INSULIN GLARGINE 100 [IU]/ML
56 INJECTION, SOLUTION SUBCUTANEOUS DAILY
Qty: 51 ML | Refills: 1 | Status: SHIPPED | OUTPATIENT
Start: 2023-05-02

## 2023-05-02 RX ORDER — ATENOLOL 25 MG/1
TABLET ORAL
COMMUNITY
Start: 2023-04-19

## 2023-05-02 NOTE — PROGRESS NOTES
Chief Complaint  Chief Complaint   Patient presents with   • Uncontrolled type 2 diabetes mellitus with hyperglycemia     Pt doesn't have his meter with him today but does have his readings states that he checks his bs once daily (am) he has no hx of retinopathy, with a mild case of neuropathy in his feet        Subjective          History of Present Illness    Gerard Gallagher 47 y.o. presents for a follow-up evaluation for type 2 DM     He has been diabetic since 2007     Pt is on the following medications for their DM: glipizide ER 10 mg daily, Lantus 56 units daily, metformin 1,000 mg twice daily and Ozempic 1 mg  Weekly     Has been out of Lantus for a couple of weeks       Not taking Novolog      Denies diarrhea, constipation, chest pain, shortness of breath, vision changes or numbness and tingling in feet/hands.    Weight loss of 3 lbs since last visit.    Pt does have a history of DM retinopathy.  Last eye exam was 09/22  Bilateral cataract surgery in 2019.     Pt does not have a history of nephropathy.  Patient is currently taking ACE     Pt does not have neuropathy.      Pt does not have a history of CAD or CVA.    Last A1C in 01/23 was 9.00    Last microalbumin in 10/22 was negative          Blood Sugars    Blood glucoses are checked 1/day.    Fasting blood glucoses: 90s - mid 100s; off lantus mid 100s - 200s    Pt has no episodes of hypoglycemia.    CGM was too expensive            Hyperlipidemia     Pt denies any muscle/body aches, chest pain or shortness of breath    Pt is currently taking Crestor 40 mg HS and zetia 10 mg daily    Last lipid panel in 01/23 showed Total 308, HDL 41,  and Triglycerides 229              Hypertension    Pt denies any chest pain, palpitations, shortness of breath or headache    Current regimen includes amlodipine 10 mg daily, atenolol 25 mg daily and lisinopril 40 mg daily            I have reviewed the patient's allergies, medicines, past medical hx, family hx and  "social hx.    Objective   Vital Signs:   /80   Pulse 93   Temp 97.1 °F (36.2 °C) (Temporal)   Ht 177.8 cm (70\")   Wt 111 kg (245 lb 6.4 oz)   SpO2 94%   BMI 35.21 kg/m²       Physical Exam   Physical Exam  Constitutional:       General: He is not in acute distress.     Appearance: Normal appearance. He is not diaphoretic.   HENT:      Head: Normocephalic and atraumatic.   Eyes:      General:         Right eye: No discharge.         Left eye: No discharge.   Skin:     General: Skin is warm and dry.   Neurological:      Mental Status: He is alert.   Psychiatric:         Mood and Affect: Mood normal.         Behavior: Behavior normal.                    Results Review:   Hemoglobin A1C   Date Value Ref Range Status   01/06/2023 9.00 (H) 4.80 - 5.60 % Final     Comment:     Hemoglobin A1C Ranges:  Increased Risk for Diabetes  5.7% to 6.4%  Diabetes                     >= 6.5%  Diabetic Goal                < 7.0%       Triglycerides   Date Value Ref Range Status   01/06/2023 229 (H) 0 - 150 mg/dL Final     HDL Cholesterol   Date Value Ref Range Status   01/06/2023 41 40 - 60 mg/dL Final     LDL Chol Calc (NIH)   Date Value Ref Range Status   01/06/2023 221 (H) 0 - 100 mg/dL Final     VLDL Cholesterol Ruben   Date Value Ref Range Status   01/06/2023 46 (H) 5 - 40 mg/dL Final         Assessment and Plan {CC Problem List  Visit Diagnosis  ROS  Review (Popup)  Health Maintenance  Quality  BestPractice  Medications  SmartSets  SnapShot Encounters  Media :23  Diagnoses and all orders for this visit:    1. Uncontrolled type 2 diabetes mellitus with hyperglycemia (HCC) (Primary)  -     Insulin Glargine (Lantus SoloStar) 100 UNIT/ML injection pen; Inject 56 Units under the skin into the appropriate area as directed Daily.  Dispense: 51 mL; Refill: 1  -     Hemoglobin A1c  -     Comprehensive Metabolic Panel  -     Microalbumin / Creatinine Urine Ratio - Urine, Clean Catch  -     TSH Rfx On Abnormal To Free " T4    Continue glipizide ER 10 mg daily, Lantus 56 units daily, metformin 1,000 mg twice daily and Ozempic 1 mg  Weekly  Stop Novolog - not been taking  Check blood sugar in the morning when you get up and again either before lunch or before dinner  Check labs  CGM was too expensive      2. Hyperlipidemia, unspecified hyperlipidemia type  -     Comprehensive Metabolic Panel  -     Lipid Panel    Check labs today  Continue with statin and zetia  If still elevated then will need to look into PSCK9 inhibitors        3. Essential hypertension  -     Comprehensive Metabolic Panel    Stable  Continue with current medication regimen  Defer management to PCP          Refills sent to pharmacy      Labs today  RTC in 3-4 months with me      Follow Up     Patient was given instructions and counseling regarding her condition or for health maintenance advice. Please see specific information pulled into the AVS if appropriate.              Esme Amin, CATA  05/02/23

## 2023-05-02 NOTE — PATIENT INSTRUCTIONS
Continue glipizide ER 10 mg daily, Lantus 56 units daily, metformin 1,000 mg twice daily and Ozempic 1 mg  Weekly  Stop Novolog  Check blood sugar in the morning when you get up and again either before lunch or before dinner

## 2023-05-03 LAB
ALBUMIN SERPL-MCNC: 4.8 G/DL (ref 3.5–5.2)
ALBUMIN/CREAT UR: 8 MG/G CREAT (ref 0–29)
ALBUMIN/GLOB SERPL: 2.2 G/DL
ALP SERPL-CCNC: 93 U/L (ref 39–117)
ALT SERPL-CCNC: 16 U/L (ref 1–41)
AST SERPL-CCNC: 16 U/L (ref 1–40)
BILIRUB SERPL-MCNC: 0.4 MG/DL (ref 0–1.2)
BUN SERPL-MCNC: 14 MG/DL (ref 6–20)
BUN/CREAT SERPL: 15.1 (ref 7–25)
CALCIUM SERPL-MCNC: 10.4 MG/DL (ref 8.6–10.5)
CHLORIDE SERPL-SCNC: 99 MMOL/L (ref 98–107)
CHOLEST SERPL-MCNC: 122 MG/DL (ref 0–200)
CO2 SERPL-SCNC: 26.3 MMOL/L (ref 22–29)
CREAT SERPL-MCNC: 0.93 MG/DL (ref 0.76–1.27)
CREAT UR-MCNC: 102.4 MG/DL
EGFRCR SERPLBLD CKD-EPI 2021: 100.7 ML/MIN/1.73
GLOBULIN SER CALC-MCNC: 2.2 GM/DL
GLUCOSE SERPL-MCNC: 152 MG/DL (ref 65–99)
HBA1C MFR BLD: 7.8 % (ref 4.8–5.6)
HDLC SERPL-MCNC: 35 MG/DL (ref 40–60)
IMP & REVIEW OF LAB RESULTS: NORMAL
LDLC SERPL CALC-MCNC: 58 MG/DL (ref 0–100)
MICROALBUMIN UR-MCNC: 8.3 UG/ML
POTASSIUM SERPL-SCNC: 4.5 MMOL/L (ref 3.5–5.2)
PROT SERPL-MCNC: 7 G/DL (ref 6–8.5)
SODIUM SERPL-SCNC: 138 MMOL/L (ref 136–145)
TRIGL SERPL-MCNC: 169 MG/DL (ref 0–150)
TSH SERPL DL<=0.005 MIU/L-ACNC: 2.21 UIU/ML (ref 0.27–4.2)
VLDLC SERPL CALC-MCNC: 29 MG/DL (ref 5–40)

## 2023-08-23 DIAGNOSIS — E78.5 HYPERLIPIDEMIA, UNSPECIFIED HYPERLIPIDEMIA TYPE: ICD-10-CM

## 2023-08-23 NOTE — TELEPHONE ENCOUNTER
Rx Refill Note  Requested Prescriptions     Pending Prescriptions Disp Refills    rosuvastatin (CRESTOR) 40 MG tablet [Pharmacy Med Name: ROSUVASTATIN 40MG TABLETS] 90 tablet 1     Sig: TAKE 1 TABLET BY MOUTH EVERY NIGHT      Last office visit with prescribing clinician: 5/2/2023   Last telemedicine visit with prescribing clinician: Visit date not found   Next office visit with prescribing clinician: 9/25/2023                         Would you like a call back once the refill request has been completed: [] Yes [] No    If the office needs to give you a call back, can they leave a voicemail: [] Yes [] No    Jaye Kelly  08/23/23, 14:01 EDT

## 2023-08-24 RX ORDER — ROSUVASTATIN CALCIUM 40 MG/1
40 TABLET, COATED ORAL NIGHTLY
Qty: 90 TABLET | Refills: 1 | Status: SHIPPED | OUTPATIENT
Start: 2023-08-24

## 2023-08-29 DIAGNOSIS — E11.65 UNCONTROLLED TYPE 2 DIABETES MELLITUS WITH HYPERGLYCEMIA: ICD-10-CM

## 2023-08-29 RX ORDER — SEMAGLUTIDE 1.34 MG/ML
INJECTION, SOLUTION SUBCUTANEOUS
Qty: 9 ML | Refills: 1 | Status: SHIPPED | OUTPATIENT
Start: 2023-08-29

## 2023-08-29 NOTE — TELEPHONE ENCOUNTER
Rx Refill Note  Requested Prescriptions     Pending Prescriptions Disp Refills    Ozempic, 1 MG/DOSE, 4 MG/3ML solution pen-injector [Pharmacy Med Name: OZEMPIC 1MG PER DOSE (1X4MG PEN)] 9 mL 1     Sig: INJECT 1 MG UNDER THE SKIN INTO THE APPROPRIATE AREA AS DIRECTED ONCE WEEKLY      Last office visit with prescribing clinician: 5/2/2023   Last telemedicine visit with prescribing clinician: Visit date not found   Next office visit with prescribing clinician: 9/25/2023                         Would you like a call back once the refill request has been completed: [] Yes [] No    If the office needs to give you a call back, can they leave a voicemail: [] Yes [] No    Jaye Kelly  08/29/23, 12:07 EDT

## 2023-12-22 ENCOUNTER — PRIOR AUTHORIZATION (OUTPATIENT)
Dept: ENDOCRINOLOGY | Age: 48
End: 2023-12-22
Payer: COMMERCIAL

## 2024-01-15 ENCOUNTER — PRIOR AUTHORIZATION (OUTPATIENT)
Dept: ENDOCRINOLOGY | Age: 49
End: 2024-01-15
Payer: COMMERCIAL

## 2024-01-16 DIAGNOSIS — E78.5 HYPERLIPIDEMIA, UNSPECIFIED HYPERLIPIDEMIA TYPE: ICD-10-CM

## 2024-01-16 RX ORDER — EZETIMIBE 10 MG/1
10 TABLET ORAL DAILY
Qty: 90 TABLET | Refills: 1 | OUTPATIENT
Start: 2024-01-16

## 2024-01-16 NOTE — TELEPHONE ENCOUNTER
Rx Refill Note  Requested Prescriptions     Pending Prescriptions Disp Refills    ezetimibe (ZETIA) 10 MG tablet [Pharmacy Med Name: EZETIMIBE 10MG TABLETS] 90 tablet 1     Sig: TAKE 1 TABLET BY MOUTH DAILY. STOP GEMFIBROZIL AND. START EZETIMIBE 10 MG DAILY      Last office visit with prescribing clinician: 5/2/2023   Last telemedicine visit with prescribing clinician: Visit date not found   Next office visit with prescribing clinician: Visit date not found                         Would you like a call back once the refill request has been completed: [] Yes [] No    If the office needs to give you a call back, can they leave a voicemail: [] Yes [] No    Malena Kelly MA  01/16/24, 08:20 EST

## 2024-03-15 DIAGNOSIS — E11.65 UNCONTROLLED TYPE 2 DIABETES MELLITUS WITH HYPERGLYCEMIA: ICD-10-CM

## 2024-03-15 RX ORDER — SEMAGLUTIDE 1.34 MG/ML
INJECTION, SOLUTION SUBCUTANEOUS
Qty: 9 ML | Refills: 1 | OUTPATIENT
Start: 2024-03-15

## 2024-03-15 NOTE — TELEPHONE ENCOUNTER
Rx Refill Note  Requested Prescriptions     Pending Prescriptions Disp Refills    Ozempic, 1 MG/DOSE, 4 MG/3ML solution pen-injector [Pharmacy Med Name: OZEMPIC 1MG PER DOSE (4MG/3ML) PFP] 9 mL 1     Sig: INJECT 1 MG UNDER THE SKIN INTO THE APPROPRIATE AREA AS DIRECTED ONCE WEEKLY      Last office visit with prescribing clinician: 5/2/2023   Last telemedicine visit with prescribing clinician: Visit date not found   Next office visit with prescribing clinician: Visit date not found                         Would you like a call back once the refill request has been completed: [] Yes [] No    If the office needs to give you a call back, can they leave a voicemail: [] Yes [] No    Jaye Kelly  03/15/24, 07:40 EDT

## 2024-04-26 ENCOUNTER — OFFICE VISIT (OUTPATIENT)
Dept: ENDOCRINOLOGY | Age: 49
End: 2024-04-26
Payer: COMMERCIAL

## 2024-04-26 VITALS
OXYGEN SATURATION: 98 % | DIASTOLIC BLOOD PRESSURE: 80 MMHG | WEIGHT: 257.6 LBS | SYSTOLIC BLOOD PRESSURE: 126 MMHG | HEART RATE: 89 BPM | TEMPERATURE: 98 F | BODY MASS INDEX: 36.88 KG/M2 | HEIGHT: 70 IN

## 2024-04-26 DIAGNOSIS — E11.65 UNCONTROLLED TYPE 2 DIABETES MELLITUS WITH HYPERGLYCEMIA: Primary | ICD-10-CM

## 2024-04-26 DIAGNOSIS — E78.5 HYPERLIPIDEMIA, UNSPECIFIED HYPERLIPIDEMIA TYPE: ICD-10-CM

## 2024-04-26 DIAGNOSIS — I10 ESSENTIAL HYPERTENSION: ICD-10-CM

## 2024-04-26 RX ORDER — SEMAGLUTIDE 0.68 MG/ML
.25-.5 INJECTION, SOLUTION SUBCUTANEOUS WEEKLY
Qty: 3 ML | Refills: 1 | Status: SHIPPED | OUTPATIENT
Start: 2024-04-26

## 2024-04-26 NOTE — PATIENT INSTRUCTIONS
Continue with glipizide ER 10 mg daily  Continue with Lantus 56 units daily   Continue with metformin 1,000 mg daily  Restart Ozempic 0.25 mg once a week for 4 week and then increase to 0.5 mg weekly  Check blood sugars daily

## 2024-04-26 NOTE — PROGRESS NOTES
"Chief Complaint  Chief Complaint   Patient presents with    Diabetes       Subjective          History of Present Illness    Gerard Gallagher 48 y.o.  presents for a follow-up evaluation for type 2 DM    Last seen in office since 05/02/23     He has been diabetic since 2007     Pt is on the following medications for their DM: glipizide ER 10 mg daily, Lantus 56 units daily and metformin 1,000 mg daily    Ozempic 1 mg  Weekly - off for about a 1-2 months      Novolog stopped 05/23 visit due to no longer needing      Denies diarrhea, constipation, chest pain, shortness of breath, vision changes or numbness and tingling in feet/hands.    Pt denies a history of diabetic retinopathy.  Last eye exam was 09/22    Pt does not have nephropathy.  Patient is currently taking ACE    Pt denies neuropathy.    Pt denies a history of CAD or CVA.    Last A1C in 05/23 was 7.8    Last microalbumin in 05/23 was negative          Blood Sugars    Blood glucoses are checked 1/day    Fasting blood glucoses: 130s    Pt has no episodes of hypoglycemia.        CGM was too expensive           Hyperlipidemia     Pt is currently taking Crestor 40 mg HS and zetia 10 mg daily     Last lipid panel in 05/23 showed Total 122, HDL 35, LDL 58 and Triglycerides 169            Hypertension    Pt denies any chest pain, palpitations, shortness of breath or headache    Current regimen includes amlodipine 10 mg daily, atenolol 25 mg daily and lisinopril 40 mg daily             I have reviewed the patient's allergies, medicines, past medical hx, family hx and social hx.    Objective   Vital Signs:   /80 (BP Location: Left arm, Patient Position: Sitting)   Pulse 89   Temp 98 °F (36.7 °C) (Oral)   Ht 177.8 cm (70\")   Wt 117 kg (257 lb 9.6 oz)   SpO2 98%   BMI 36.96 kg/m²       Physical Exam   Physical Exam  Constitutional:       General: He is not in acute distress.     Appearance: Normal appearance. He is obese. He is not diaphoretic.   HENT:      " Head: Normocephalic and atraumatic.   Eyes:      General:         Right eye: No discharge.         Left eye: No discharge.   Skin:     General: Skin is warm and dry.   Neurological:      Mental Status: He is alert.   Psychiatric:         Mood and Affect: Mood normal.         Behavior: Behavior normal.                    Results Review:   Hemoglobin A1C   Date Value Ref Range Status   05/02/2023 7.80 (H) 4.80 - 5.60 % Final     Comment:     Hemoglobin A1C Ranges:  Increased Risk for Diabetes  5.7% to 6.4%  Diabetes                     >= 6.5%  Diabetic Goal                < 7.0%       Triglycerides   Date Value Ref Range Status   05/02/2023 169 (H) 0 - 150 mg/dL Final     HDL Cholesterol   Date Value Ref Range Status   05/02/2023 35 (L) 40 - 60 mg/dL Final     LDL Chol Calc (NIH)   Date Value Ref Range Status   05/02/2023 58 0 - 100 mg/dL Final     VLDL Cholesterol Ruben   Date Value Ref Range Status   05/02/2023 29 5 - 40 mg/dL Final         Assessment and Plan {CC Problem List  Visit Diagnosis  ROS  Review (Popup)  Health Maintenance  Quality  BestPractice  Medications  SmartSets  SnapShot Encounters  Media :23  Diagnoses and all orders for this visit:    1. Uncontrolled type 2 diabetes mellitus with hyperglycemia (Primary)  -     Hemoglobin A1c; Future  -     Comprehensive Metabolic Panel; Future  -     Microalbumin / Creatinine Urine Ratio - Urine, Clean Catch; Future  -     Semaglutide,0.25 or 0.5MG/DOS, (Ozempic, 0.25 or 0.5 MG/DOSE,) 2 MG/3ML solution pen-injector; Inject 0.25-0.5 mg under the skin into the appropriate area as directed 1 (One) Time Per Week. Inject 0.25 mg weekly for four weeks, then increase to 0.5 mg weekly  Dispense: 3 mL; Refill: 1    Check labs next week  Continue with glipizide ER 10 mg daily  Continue with Lantus 56 units daily   Continue with metformin 1,000 mg daily - states that when he took twice a day he had hypoglycemia  Been off Ozempic for at least a month, maybe  2  Restart Ozempic 0.25 mg once a week for 4 week and then increase to 0.5 mg weekly  Check blood sugars daily      2. Hyperlipidemia, unspecified hyperlipidemia type  -     Comprehensive Metabolic Panel; Future  -     Lipid Panel; Future    Check labs next week  Continue with statin and Zetia      3. Essential hypertension  -     Comprehensive Metabolic Panel; Future    Stable  Continue with current medication regimen  Defer management to PCP           Labs next week  RTC in 4 months with me      Follow Up     Patient was given instructions and counseling regarding her condition or for health maintenance advice. Please see specific information pulled into the AVS if appropriate.                Esme Amin, CATA  04/26/24

## 2024-04-29 ENCOUNTER — PRIOR AUTHORIZATION (OUTPATIENT)
Dept: ENDOCRINOLOGY | Age: 49
End: 2024-04-29
Payer: COMMERCIAL

## 2024-04-29 NOTE — TELEPHONE ENCOUNTER
A PA for Ozempic (0.25 or 0.5 MG/DOSE) 2MG/3ML pen-injectors has been started.    (Key: [id number])  Rx #: [id number]

## 2024-04-29 NOTE — TELEPHONE ENCOUNTER
This medication or product was previously approved on [id number] from 2023-12-22 to 2024-12-22. **Please note: This request was submitted electronically. Formulary lowering, tiering exception, cost reduction and/or pre-benefit determination review (including prospective Medicare hospice reviews) requests cannot be requested using this method of submission. Providers contact us at [phone] for further assistance.

## 2024-04-30 DIAGNOSIS — E78.5 HYPERLIPIDEMIA, UNSPECIFIED HYPERLIPIDEMIA TYPE: ICD-10-CM

## 2024-04-30 RX ORDER — ROSUVASTATIN CALCIUM 40 MG/1
40 TABLET, COATED ORAL NIGHTLY
Qty: 90 TABLET | Refills: 1 | Status: SHIPPED | OUTPATIENT
Start: 2024-04-30

## 2024-04-30 NOTE — TELEPHONE ENCOUNTER
Rx Refill Note  Requested Prescriptions     Pending Prescriptions Disp Refills    rosuvastatin (CRESTOR) 40 MG tablet [Pharmacy Med Name: ROSUVASTATIN 40MG TABLETS] 90 tablet 1     Sig: TAKE 1 TABLET BY MOUTH EVERY NIGHT      Last office visit with prescribing clinician: 4/26/2024   Last telemedicine visit with prescribing clinician: Visit date not found   Next office visit with prescribing clinician: 8/26/2024                         Would you like a call back once the refill request has been completed: [] Yes [] No    If the office needs to give you a call back, can they leave a voicemail: [] Yes [] No    Jaye Kelly  04/30/24, 11:04 EDT

## 2024-05-06 ENCOUNTER — LAB (OUTPATIENT)
Dept: ENDOCRINOLOGY | Age: 49
End: 2024-05-06
Payer: COMMERCIAL

## 2024-05-06 DIAGNOSIS — E78.5 HYPERLIPIDEMIA, UNSPECIFIED HYPERLIPIDEMIA TYPE: ICD-10-CM

## 2024-05-06 DIAGNOSIS — E11.65 UNCONTROLLED TYPE 2 DIABETES MELLITUS WITH HYPERGLYCEMIA: ICD-10-CM

## 2024-05-06 DIAGNOSIS — I10 ESSENTIAL HYPERTENSION: ICD-10-CM

## 2024-05-07 DIAGNOSIS — E78.5 HYPERLIPIDEMIA, UNSPECIFIED HYPERLIPIDEMIA TYPE: ICD-10-CM

## 2024-05-07 LAB
ALBUMIN SERPL-MCNC: 4.8 G/DL (ref 3.5–5.2)
ALBUMIN/CREAT UR: 9 MG/G CREAT (ref 0–29)
ALBUMIN/GLOB SERPL: 1.9 G/DL
ALP SERPL-CCNC: 87 U/L (ref 39–117)
ALT SERPL-CCNC: 42 U/L (ref 1–41)
AST SERPL-CCNC: 29 U/L (ref 1–40)
BILIRUB SERPL-MCNC: 0.4 MG/DL (ref 0–1.2)
BUN SERPL-MCNC: 13 MG/DL (ref 6–20)
BUN/CREAT SERPL: 11.4 (ref 7–25)
CALCIUM SERPL-MCNC: 9.9 MG/DL (ref 8.6–10.5)
CHLORIDE SERPL-SCNC: 100 MMOL/L (ref 98–107)
CHOLEST SERPL-MCNC: 232 MG/DL (ref 0–200)
CO2 SERPL-SCNC: 24.6 MMOL/L (ref 22–29)
CREAT SERPL-MCNC: 1.14 MG/DL (ref 0.76–1.27)
CREAT UR-MCNC: 289.5 MG/DL
EGFRCR SERPLBLD CKD-EPI 2021: 78.4 ML/MIN/1.73
GLOBULIN SER CALC-MCNC: 2.5 GM/DL
GLUCOSE SERPL-MCNC: 231 MG/DL (ref 65–99)
HBA1C MFR BLD: 8.3 % (ref 4.8–5.6)
HDLC SERPL-MCNC: 42 MG/DL (ref 40–60)
IMP & REVIEW OF LAB RESULTS: NORMAL
LDLC SERPL CALC-MCNC: 128 MG/DL (ref 0–100)
MICROALBUMIN UR-MCNC: 27.1 UG/ML
POTASSIUM SERPL-SCNC: 4.8 MMOL/L (ref 3.5–5.2)
PROT SERPL-MCNC: 7.3 G/DL (ref 6–8.5)
SODIUM SERPL-SCNC: 139 MMOL/L (ref 136–145)
TRIGL SERPL-MCNC: 345 MG/DL (ref 0–150)
VLDLC SERPL CALC-MCNC: 62 MG/DL (ref 5–40)

## 2024-05-07 RX ORDER — EZETIMIBE 10 MG/1
10 TABLET ORAL DAILY
Qty: 90 TABLET | Refills: 1 | Status: SHIPPED | OUTPATIENT
Start: 2024-05-07

## 2024-05-09 DIAGNOSIS — E11.65 UNCONTROLLED TYPE 2 DIABETES MELLITUS WITH HYPERGLYCEMIA: ICD-10-CM

## 2024-05-10 RX ORDER — INSULIN GLARGINE 100 [IU]/ML
56 INJECTION, SOLUTION SUBCUTANEOUS DAILY
Qty: 51 ML | Refills: 1 | Status: SHIPPED | OUTPATIENT
Start: 2024-05-10

## 2024-05-13 ENCOUNTER — PRIOR AUTHORIZATION (OUTPATIENT)
Dept: ENDOCRINOLOGY | Age: 49
End: 2024-05-13
Payer: COMMERCIAL

## 2024-05-13 NOTE — TELEPHONE ENCOUNTER
A PA has been started for Ozempic (0.25 or 0.5 MG/DOSE) 2MG/3ML pen-injectors  (Key: [id number])    Message from Plan  This medication or product was previously approved on [id number] from 2023-12-22 to 2024-12-22. **Please note: This request was submitted electronically. Formulary lowering, tiering exception, cost reduction and/or pre-benefit determination review (including prospective Medicare hospice reviews) requests cannot be requested using this method of submission. Providers contact us at [phone] for further assistance.

## 2024-07-31 DIAGNOSIS — E11.65 UNCONTROLLED TYPE 2 DIABETES MELLITUS WITH HYPERGLYCEMIA: ICD-10-CM

## 2024-07-31 NOTE — TELEPHONE ENCOUNTER
Rx Refill Note  Requested Prescriptions     Pending Prescriptions Disp Refills    Ozempic, 0.25 or 0.5 MG/DOSE, 2 MG/3ML solution pen-injector [Pharmacy Med Name: OZEMPIC 0.25 OR 0.5MG/[id number] 3ML] 3 mL 1     Sig: INJECT 0.25 MG UNDER THE SKIN ONCE PER WEEK FOR 4 WEEKS THEN 0.5 MG UNDER THE SKIN ONCE PER WEEK      Last office visit with prescribing clinician: 4/26/2024   Last telemedicine visit with prescribing clinician: Visit date not found   Next office visit with prescribing clinician: 8/26/2024                         Would you like a call back once the refill request has been completed: [] Yes [] No    If the office needs to give you a call back, can they leave a voicemail: [] Yes [] No    Jaye Kelly  07/31/24, 14:41 EDT

## 2024-08-01 RX ORDER — SEMAGLUTIDE 0.68 MG/ML
0.5 INJECTION, SOLUTION SUBCUTANEOUS WEEKLY
Qty: 3 ML | Refills: 3 | Status: SHIPPED | OUTPATIENT
Start: 2024-08-01

## 2024-09-23 RX ORDER — GLIPIZIDE 10 MG/1
10 TABLET, FILM COATED, EXTENDED RELEASE ORAL DAILY
Qty: 90 TABLET | Refills: 1 | Status: SHIPPED | OUTPATIENT
Start: 2024-09-23

## 2024-10-30 NOTE — TELEPHONE ENCOUNTER
Caller: MARIA EUGENIA VARGAS    Relationship: SELF    Best call back number: [phone]    Requested Prescriptions: PATIENT IS OUT OF MEDICATION  Requested Prescriptions     Pending Prescriptions Disp Refills    metFORMIN (GLUCOPHAGE) 1000 MG tablet       Sig: Take 1 tablet by mouth Daily With Breakfast.        [id number] [place], [place] - [address] AT [facility] [phone] [facility] [phone] FX     Last office visit with prescribing clinician: 4/26/2024   Last telemedicine visit with prescribing clinician: Visit date not found   Next office visit with prescribing clinician: 11/1/2024     Additional details provided by patient:     Does the patient have less than a 3 day supply:  [] Yes  [] No    Would you like a call back once the refill request has been completed: [] Yes [] No    If the office needs to give you a call back, can they leave a voicemail: [] Yes [] No    Xavier Smith Rep   10/30/24 16:10 EDT

## 2024-12-03 DIAGNOSIS — E11.65 UNCONTROLLED TYPE 2 DIABETES MELLITUS WITH HYPERGLYCEMIA: ICD-10-CM

## 2024-12-03 RX ORDER — SEMAGLUTIDE 0.68 MG/ML
INJECTION, SOLUTION SUBCUTANEOUS
Qty: 3 ML | Refills: 3 | OUTPATIENT
Start: 2024-12-03

## 2024-12-03 NOTE — TELEPHONE ENCOUNTER
Rx Refill Note  Requested Prescriptions     Pending Prescriptions Disp Refills    Ozempic, 0.25 or 0.5 MG/DOSE, 2 MG/3ML solution pen-injector [Pharmacy Med Name: OZEMPIC 0.25-0.5 MG/DOSE(2 MG/3 ML)] 3 mL 3     Sig: DIAL AND INJECT UNDER THE SKIN 0.5 MG WEEKLY AS DIRECTED      Last office visit with prescribing clinician: 4/26/2024   Last telemedicine visit with prescribing clinician: Visit date not found   Next office visit with prescribing clinician: 12/5/2024                         Would you like a call back once the refill request has been completed: [] Yes [] No    If the office needs to give you a call back, can they leave a voicemail: [] Yes [] No    Jaye Kelly  12/03/24, 09:34 EST

## 2024-12-08 DIAGNOSIS — E11.65 UNCONTROLLED TYPE 2 DIABETES MELLITUS WITH HYPERGLYCEMIA: ICD-10-CM

## 2024-12-09 DIAGNOSIS — E11.65 UNCONTROLLED TYPE 2 DIABETES MELLITUS WITH HYPERGLYCEMIA: ICD-10-CM

## 2024-12-09 RX ORDER — SEMAGLUTIDE 0.68 MG/ML
INJECTION, SOLUTION SUBCUTANEOUS
Qty: 3 ML | Refills: 3 | OUTPATIENT
Start: 2024-12-09

## 2024-12-09 NOTE — TELEPHONE ENCOUNTER
Pt informed that he will need to get his refills when he is seen on the 12th and had no further questions.

## 2024-12-09 NOTE — TELEPHONE ENCOUNTER
Rx Refill Note  Requested Prescriptions     Pending Prescriptions Disp Refills    Ozempic, 0.25 or 0.5 MG/DOSE, 2 MG/3ML solution pen-injector [Pharmacy Med Name: OZEMPIC 0.25-0.5 MG/DOSE(2 MG/3 ML)] 3 mL 3     Sig: DIAL AND INJECT UNDER THE SKIN 0.5 MG WEEKLY AS DIRECTED      Last office visit with prescribing clinician: 4/26/2024   Last telemedicine visit with prescribing clinician: Visit date not found   Next office visit with prescribing clinician: 2/5/2025                         Would you like a call back once the refill request has been completed: [] Yes [] No    If the office needs to give you a call back, can they leave a voicemail: [] Yes [] No    Jaye Kelly  12/09/24, 07:32 EST

## 2024-12-09 NOTE — TELEPHONE ENCOUNTER
Rx Refill Note  Requested Prescriptions     Pending Prescriptions Disp Refills    Ozempic, 0.25 or 0.5 MG/DOSE, 2 MG/3ML solution pen-injector [Pharmacy Med Name: OZEMPIC 0.25-0.5 MG/DOSE(2 MG/3 ML)] 3 mL 3     Sig: DIAL AND INJECT UNDER THE SKIN 0.5 MG WEEKLY AS DIRECTED      Last office visit with prescribing clinician: 4/26/2024   Last telemedicine visit with prescribing clinician: Visit date not found   Next office visit with prescribing clinician: 12/12/2024                         Would you like a call back once the refill request has been completed: [] Yes [] No    If the office needs to give you a call back, can they leave a voicemail: [] Yes [] No    Jaye Kelly  12/09/24, 12:59 EST

## 2024-12-09 NOTE — TELEPHONE ENCOUNTER
Patient was returning Jaye's call. He said we can leave a detailed message on his phone if we do not answer

## 2024-12-16 ENCOUNTER — TELEPHONE (OUTPATIENT)
Dept: ENDOCRINOLOGY | Age: 49
End: 2024-12-16
Payer: COMMERCIAL

## 2024-12-16 NOTE — TELEPHONE ENCOUNTER
Called pt to confirm appt for tomorrow. Was able to speak with pt and confirmed appt for tomorrow. Pt had no further questions or concerns.

## 2024-12-17 ENCOUNTER — OFFICE VISIT (OUTPATIENT)
Dept: ENDOCRINOLOGY | Age: 49
End: 2024-12-17
Payer: COMMERCIAL

## 2024-12-17 VITALS
BODY MASS INDEX: 34.39 KG/M2 | HEART RATE: 90 BPM | DIASTOLIC BLOOD PRESSURE: 84 MMHG | HEIGHT: 70 IN | WEIGHT: 240.2 LBS | SYSTOLIC BLOOD PRESSURE: 144 MMHG | TEMPERATURE: 98 F | OXYGEN SATURATION: 95 %

## 2024-12-17 DIAGNOSIS — Z91.199 NONCOMPLIANCE: ICD-10-CM

## 2024-12-17 DIAGNOSIS — I10 ESSENTIAL HYPERTENSION: ICD-10-CM

## 2024-12-17 DIAGNOSIS — E78.5 HYPERLIPIDEMIA, UNSPECIFIED HYPERLIPIDEMIA TYPE: ICD-10-CM

## 2024-12-17 DIAGNOSIS — E11.65 UNCONTROLLED TYPE 2 DIABETES MELLITUS WITH HYPERGLYCEMIA: Primary | ICD-10-CM

## 2024-12-17 LAB
ALBUMIN SERPL-MCNC: 4.3 G/DL (ref 3.5–5.2)
ALBUMIN/GLOB SERPL: 1.7 G/DL
ALP SERPL-CCNC: 86 U/L (ref 39–117)
ALT SERPL-CCNC: 24 U/L (ref 1–41)
AST SERPL-CCNC: 20 U/L (ref 1–40)
BILIRUB SERPL-MCNC: 0.3 MG/DL (ref 0–1.2)
BUN SERPL-MCNC: 10 MG/DL (ref 6–20)
BUN/CREAT SERPL: 10.3 (ref 7–25)
CALCIUM SERPL-MCNC: 9.4 MG/DL (ref 8.6–10.5)
CHLORIDE SERPL-SCNC: 101 MMOL/L (ref 98–107)
CHOLEST SERPL-MCNC: 305 MG/DL (ref 0–200)
CO2 SERPL-SCNC: 28.9 MMOL/L (ref 22–29)
CREAT SERPL-MCNC: 0.97 MG/DL (ref 0.76–1.27)
EGFRCR SERPLBLD CKD-EPI 2021: 94.5 ML/MIN/1.73
GLOBULIN SER CALC-MCNC: 2.5 GM/DL
GLUCOSE SERPL-MCNC: 145 MG/DL (ref 65–99)
HBA1C MFR BLD: 9.9 % (ref 4.8–5.6)
HDLC SERPL-MCNC: 41 MG/DL (ref 40–60)
IMP & REVIEW OF LAB RESULTS: NORMAL
LDLC SERPL CALC-MCNC: 205 MG/DL (ref 0–100)
POTASSIUM SERPL-SCNC: 4.4 MMOL/L (ref 3.5–5.2)
PROT SERPL-MCNC: 6.8 G/DL (ref 6–8.5)
SODIUM SERPL-SCNC: 141 MMOL/L (ref 136–145)
TRIGL SERPL-MCNC: 292 MG/DL (ref 0–150)
VLDLC SERPL CALC-MCNC: 59 MG/DL (ref 5–40)

## 2024-12-17 RX ORDER — EZETIMIBE 10 MG/1
10 TABLET ORAL DAILY
Qty: 90 TABLET | Refills: 1 | Status: SHIPPED | OUTPATIENT
Start: 2024-12-17

## 2024-12-17 RX ORDER — SEMAGLUTIDE 0.68 MG/ML
0.5 INJECTION, SOLUTION SUBCUTANEOUS WEEKLY
Qty: 3 ML | Refills: 3 | Status: SHIPPED | OUTPATIENT
Start: 2024-12-17

## 2024-12-17 RX ORDER — ROSUVASTATIN CALCIUM 40 MG/1
40 TABLET, COATED ORAL NIGHTLY
Qty: 90 TABLET | Refills: 1 | Status: SHIPPED | OUTPATIENT
Start: 2024-12-17

## 2024-12-17 NOTE — PATIENT INSTRUCTIONS
Continue with glipizide ER 10 mg daily, Lantus 56 units daily and metformin 1,000 mg daily  Restart Ozempic  Check blood sugars daily, varying the time of the day (in the morning when you get up, before lunch/dinner, bedtime or 2 hours after a meal)      Restart Crestor and zetia

## 2024-12-17 NOTE — PROGRESS NOTES
"Chief Complaint  Chief Complaint   Patient presents with    Diabetes     Type 2 : Pt doesn't have meter today, doesn't check bs regularly, is not up to date on eye exam, no hx of retinopathy or neuropathy.         Subjective          History of Present Illness    Gerard Gallagher 49 y.o. presents for a follow-up for Type 2 Diabetes    Last seen in office 04/26/2024    He was diagnosed with diabetes in 2007    Pt is on the following medications for their diabetes: glipizide ER 10 mg daily, Lantus 56 units daily and metformin 1,000 mg daily        Ozempic off couple weeks     Novolog stopped 05/23 visit due to no longer needing  Metformin 1,000 mg twice daily caused \"hypoglycemia\"        Denies vision changes or numbness and tingling in feet/hands.    Pt denies a history of diabetic retinopathy.  Last eye exam was 09/22     Pt does not have nephropathy.  Patient is currently taking ACE     Pt denies neuropathy.     Pt denies a history of CAD or CVA.    Last A1C in 05/24 was 8.3    Last microalbumin in 05/24 was negative          Blood Sugars    Blood glucoses are not checked        CGM was too expensive             Hyperlipidemia     Pt is currently taking Crestor 40 mg HS and zetia 10 mg daily - not been taking either    Last lipid panel in 05/24 showed Total 232, HDL 42,  and Triglycerides 345            Hypertension    Current regimen includes amlodipine 10 mg daily, atenolol 25 mg daily and lisinopril 40 mg daily             I have reviewed the patient's allergies, medicines, past medical hx, family hx and social hx.    Objective   Vital Signs:   /84   Pulse 90   Temp 98 °F (36.7 °C) (Oral)   Ht 177.8 cm (70\")   Wt 109 kg (240 lb 3.2 oz)   SpO2 95%   BMI 34.47 kg/m²       Physical Exam   Physical Exam  Constitutional:       General: He is not in acute distress.     Appearance: Normal appearance. He is not diaphoretic.   HENT:      Head: Normocephalic and atraumatic.   Eyes:      General:         " Right eye: No discharge.         Left eye: No discharge.   Skin:     General: Skin is warm and dry.   Neurological:      Mental Status: He is alert.   Psychiatric:         Mood and Affect: Mood normal.         Behavior: Behavior normal.                    Results Review:   Hemoglobin A1C   Date Value Ref Range Status   05/06/2024 8.30 (H) 4.80 - 5.60 % Final     Comment:     Hemoglobin A1C Ranges:  Increased Risk for Diabetes  5.7% to 6.4%  Diabetes                     >= 6.5%  Diabetic Goal                < 7.0%       Triglycerides   Date Value Ref Range Status   05/06/2024 345 (H) 0 - 150 mg/dL Final     HDL Cholesterol   Date Value Ref Range Status   05/06/2024 42 40 - 60 mg/dL Final     LDL Chol Calc (NIH)   Date Value Ref Range Status   05/06/2024 128 (H) 0 - 100 mg/dL Final     VLDL Cholesterol Ruben   Date Value Ref Range Status   05/06/2024 62 (H) 5 - 40 mg/dL Final         Assessment and Plan {CC Problem List  Visit Diagnosis  ROS  Review (Popup)  Health Maintenance  Quality  BestPractice  Medications  SmartSets  SnapShot Encounters  Media :23  Diagnoses and all orders for this visit:    1. Uncontrolled type 2 diabetes mellitus with hyperglycemia (Primary)  -     Semaglutide,0.25 or 0.5MG/DOS, (Ozempic, 0.25 or 0.5 MG/DOSE,) 2 MG/3ML solution pen-injector; Inject 0.5 mg under the skin into the appropriate area as directed 1 (One) Time Per Week.  Dispense: 3 mL; Refill: 3  -     Hemoglobin A1c  -     Comprehensive Metabolic Panel    Continue with glipizide ER 10 mg daily  Continue with Lantus 56 units daily   Continue with metformin 1,000 mg daily  Restart Ozempic  Check blood sugars daily, varying the time of the day (in the morning when you get up, before lunch/dinner, bedtime or 2 hours after a meal)  Check labs today      2. Noncompliance    Pt has not been seen since 04/26/24 and has canceled Several appointments since then after getting refills of his medications  Pt has not been checking  blood sugars as advised      3. Hyperlipidemia, unspecified hyperlipidemia type  -     rosuvastatin (CRESTOR) 40 MG tablet; Take 1 tablet by mouth Every Night.  Dispense: 90 tablet; Refill: 1  -     ezetimibe (ZETIA) 10 MG tablet; Take 1 tablet by mouth Daily. Stop gemfibrozil and start ezetimibe 10 mg daily  Dispense: 90 tablet; Refill: 1  -     Comprehensive Metabolic Panel  -     Lipid Panel    Check labs today  Restart Crestor and zetia        4. Essential hypertension  -     Comprehensive Metabolic Panel    Continue with current medication regimen  Defer management to PCP           Refills sent to pharmacy      Labs today  RTC in 3 months with me      Follow Up     Patient was given instructions and counseling regarding her condition or for health maintenance advice. Please see specific information pulled into the AVS if appropriate.                Esme Amin, CATA  12/17/24

## 2024-12-20 ENCOUNTER — TELEPHONE (OUTPATIENT)
Dept: ENDOCRINOLOGY | Age: 49
End: 2024-12-20
Payer: COMMERCIAL

## 2024-12-20 NOTE — TELEPHONE ENCOUNTER
Left pt a message to return call.    OKAY FOR HUB AND  TO READ RESULTS.      A1c is higher at 9.9% - make sure to restart Ozempic    Kidney function is good.    Total cholesterol, LDL and Triglycerides are all elevated.  Make sure to restart statin and Zetia

## 2024-12-23 NOTE — TELEPHONE ENCOUNTER
Rx Refill Note  Requested Prescriptions     Pending Prescriptions Disp Refills    metFORMIN (GLUCOPHAGE) 1000 MG tablet [Pharmacy Med Name: METFORMIN HCL 1,000 MG TABLET] 30 tablet 1     Sig: TAKE 1 TABLET BY MOUTH DAILY WITH BREAKFAST      Last office visit with prescribing clinician: 12/17/2024   Last telemedicine visit with prescribing clinician: Visit date not found   Next office visit with prescribing clinician: 3/17/2025                         Would you like a call back once the refill request has been completed: [] Yes [] No    If the office needs to give you a call back, can they leave a voicemail: [] Yes [] No    Jaye Kelly  12/23/24, 08:00 EST

## 2024-12-30 DIAGNOSIS — E11.65 UNCONTROLLED TYPE 2 DIABETES MELLITUS WITH HYPERGLYCEMIA: ICD-10-CM

## 2024-12-30 RX ORDER — INSULIN GLARGINE 100 [IU]/ML
INJECTION, SOLUTION SUBCUTANEOUS
Qty: 18 ML | Refills: 0 | Status: SHIPPED | OUTPATIENT
Start: 2024-12-30

## 2024-12-30 NOTE — TELEPHONE ENCOUNTER
Rx Refill Note  Requested Prescriptions     Pending Prescriptions Disp Refills    Insulin Glargine (Lantus SoloStar) 100 UNIT/ML injection pen [Pharmacy Med Name: LANTUS SOLOSTAR 100 UNIT/ML] 15 mL 0     Sig: INJECT 56 UNITS UNDER THE SKIN DAILY AS DIRECTED      Last office visit with prescribing clinician: 12/17/2024   Last telemedicine visit with prescribing clinician: Visit date not found   Next office visit with prescribing clinician: 3/17/2025                         Would you like a call back once the refill request has been completed: [] Yes [] No    If the office needs to give you a call back, can they leave a voicemail: [] Yes [] No    Jaye Kelly  12/30/24, 13:41 EST

## 2025-02-04 DIAGNOSIS — E11.65 UNCONTROLLED TYPE 2 DIABETES MELLITUS WITH HYPERGLYCEMIA: ICD-10-CM

## 2025-02-04 RX ORDER — INSULIN GLARGINE 100 [IU]/ML
56 INJECTION, SOLUTION SUBCUTANEOUS DAILY
Qty: 18 ML | Refills: 2 | Status: SHIPPED | OUTPATIENT
Start: 2025-02-04

## 2025-02-04 NOTE — TELEPHONE ENCOUNTER
Rx Refill Note  Requested Prescriptions     Pending Prescriptions Disp Refills    Insulin Glargine (Lantus SoloStar) 100 UNIT/ML injection pen 18 mL 0      Last office visit with prescribing clinician: 12/17/2024   Last telemedicine visit with prescribing clinician: Visit date not found   Next office visit with prescribing clinician: 3/17/2025                         Would you like a call back once the refill request has been completed: [] Yes [] No    If the office needs to give you a call back, can they leave a voicemail: [] Yes [] No    Malena Kelly MA  02/04/25, 10:13 EST

## 2025-03-14 ENCOUNTER — TELEPHONE (OUTPATIENT)
Dept: ENDOCRINOLOGY | Age: 50
End: 2025-03-14
Payer: COMMERCIAL

## 2025-03-17 ENCOUNTER — OFFICE VISIT (OUTPATIENT)
Dept: ENDOCRINOLOGY | Age: 50
End: 2025-03-17
Payer: COMMERCIAL

## 2025-03-17 VITALS
TEMPERATURE: 98.4 F | DIASTOLIC BLOOD PRESSURE: 92 MMHG | HEART RATE: 102 BPM | HEIGHT: 70 IN | BODY MASS INDEX: 36.25 KG/M2 | WEIGHT: 253.2 LBS | OXYGEN SATURATION: 98 % | SYSTOLIC BLOOD PRESSURE: 144 MMHG

## 2025-03-17 DIAGNOSIS — E78.5 HYPERLIPIDEMIA, UNSPECIFIED HYPERLIPIDEMIA TYPE: ICD-10-CM

## 2025-03-17 DIAGNOSIS — I10 ESSENTIAL HYPERTENSION: ICD-10-CM

## 2025-03-17 DIAGNOSIS — E11.65 UNCONTROLLED TYPE 2 DIABETES MELLITUS WITH HYPERGLYCEMIA: Primary | ICD-10-CM

## 2025-03-17 PROCEDURE — 99214 OFFICE O/P EST MOD 30 MIN: CPT | Performed by: NURSE PRACTITIONER

## 2025-03-17 NOTE — PROGRESS NOTES
"Chief Complaint  Chief Complaint   Patient presents with    Diabetes     Type 2 : Pt doesn't have meter today, doesn't check bs regularly, is not up to date on eye exam, no hx of retinopathy or neuropathy.         Subjective        History of Present Illness    Gerard Gallagher 49 y.o. presents for a follow-up for Type 2 Diabetes     He was diagnosed with diabetes in 2007     Pt is on the following medications for their diabetes: glipizide ER 10 mg daily, Lantus 56 units daily and metformin 1,000 mg daily and Ozempic 0.5 mg weekly           Novolog stopped 05/23 visit due to no longer needing  Metformin 1,000 mg twice daily caused \"hypoglycemia\"         Denies nausea, diarrhea, constipation, chest pain, shortness of breath, vision changes or numbness and tingling in feet/hands.    Pt denies a history of diabetic retinopathy.  Last eye exam was 09/22     Pt does not have nephropathy.  Patient is currently taking ACE     Pt denies neuropathy.     Pt denies a history of CAD or CVA.    Last A1C in 12/24 was 9.90    Last microalbumin in 05/24 was negative           Blood Sugars    Blood glucoses are not checked        CGM was too expensive                   Hyperlipidemia     Pt is currently taking Crestor 40 mg HS and zetia 10 mg daily     Last lipid panel in 12/24 showed Total 305, HDL 41,  and Triglycerides 292            Hypertension    Pt denies any chest pain, palpitations, shortness of breath or headache    Current regimen includes amlodipine 10 mg daily, atenolol 25 mg daily and lisinopril 40 mg daily             I have reviewed the patient's allergies, medicines, past medical hx, family hx and social hx.    Objective   Vital Signs:   /92   Pulse 102   Temp 98.4 °F (36.9 °C) (Oral)   Ht 177.8 cm (70\")   Wt 115 kg (253 lb 3.2 oz)   SpO2 98%   BMI 36.33 kg/m²       Physical Exam   Physical Exam  Constitutional:       General: He is not in acute distress.     Appearance: Normal appearance. He is " "obese. He is not diaphoretic.   HENT:      Head: Normocephalic and atraumatic.   Eyes:      General:         Right eye: No discharge.         Left eye: No discharge.   Skin:     General: Skin is warm and dry.   Neurological:      Mental Status: He is alert.   Psychiatric:         Mood and Affect: Mood normal.         Behavior: Behavior normal.                    Results Review:   Hemoglobin A1C   Date Value Ref Range Status   12/17/2024 9.90 (H) 4.80 - 5.60 % Final     Comment:     Hemoglobin A1C Ranges:  Increased Risk for Diabetes  5.7% to 6.4%  Diabetes                     >= 6.5%  Diabetic Goal                < 7.0%       Triglycerides   Date Value Ref Range Status   12/17/2024 292 (H) 0 - 150 mg/dL Final     HDL Cholesterol   Date Value Ref Range Status   12/17/2024 41 40 - 60 mg/dL Final     LDL Chol Calc (NIH)   Date Value Ref Range Status   12/17/2024 205 (H) 0 - 100 mg/dL Final     VLDL Cholesterol Ruben   Date Value Ref Range Status   12/17/2024 59 (H) 5 - 40 mg/dL Final         Assessment and Plan  Diagnoses and all orders for this visit:    1. Uncontrolled type 2 diabetes mellitus with hyperglycemia (Primary)  -     Hemoglobin A1c  -     Comprehensive Metabolic Panel  -     Microalbumin / Creatinine Urine Ratio - Urine, Clean Catch    Pt has not been checking blood sugar readings like I had asked from last visit - stating that he \"forgets\"  Again discussed why checking home blood sugar readings are important  Check blood sugars daily, varying the time of the day (in the morning when you get up, before lunch/dinner, bedtime or 2 hours after a meal)  Since I have no recent blood sugar readings nor recent A1c  Continue with glipizide ER 10 mg daily  Continue with Lantus 56 units daily   Continue with metformin 1,000 mg daily   Continue with Ozempic 0.5 mg weekly  Check labs today and will adjust medications, if needed, after results are back      2. Hyperlipidemia, unspecified hyperlipidemia type  -     " Comprehensive Metabolic Panel  -     Lipid Panel    Pt states that he has been taking statin and Zetia  Check labs today      3. Essential hypertension  -     Comprehensive Metabolic Panel    Blood pressure noted elevated today  Pt states that he has taken his medication today  Advised to monitor at home and follow up with PCP as soon as possible  Defer management to PCP      No refills needed at this time      Labs today  RTC in 3 months with me      Follow Up    Patient was given instructions and counseling regarding her condition or for health maintenance advice. Please see specific information pulled into the AVS if appropriate.                Esme Amin, CATA  03/17/25

## 2025-03-17 NOTE — PATIENT INSTRUCTIONS
Check blood sugars daily, varying the time of the day (in the morning when you get up, before lunch/dinner, bedtime or 2 hours after a meal)

## 2025-03-18 LAB
ALBUMIN SERPL-MCNC: 4.1 G/DL (ref 3.5–5.2)
ALBUMIN/CREAT UR: 16 MG/G CREAT (ref 0–29)
ALBUMIN/GLOB SERPL: 1.6 G/DL
ALP SERPL-CCNC: 94 U/L (ref 39–117)
ALT SERPL-CCNC: 20 U/L (ref 1–41)
AST SERPL-CCNC: 21 U/L (ref 1–40)
BILIRUB SERPL-MCNC: 0.2 MG/DL (ref 0–1.2)
BUN SERPL-MCNC: 11 MG/DL (ref 6–20)
BUN/CREAT SERPL: 10.1 (ref 7–25)
CALCIUM SERPL-MCNC: 8.5 MG/DL (ref 8.6–10.5)
CHLORIDE SERPL-SCNC: 103 MMOL/L (ref 98–107)
CHOLEST SERPL-MCNC: 115 MG/DL (ref 0–200)
CO2 SERPL-SCNC: 24.7 MMOL/L (ref 22–29)
CREAT SERPL-MCNC: 1.09 MG/DL (ref 0.76–1.27)
CREAT UR-MCNC: 184.7 MG/DL
EGFRCR SERPLBLD CKD-EPI 2021: 82.2 ML/MIN/1.73
GLOBULIN SER CALC-MCNC: 2.6 GM/DL
GLUCOSE SERPL-MCNC: 240 MG/DL (ref 65–99)
HBA1C MFR BLD: 8.9 % (ref 4.8–5.6)
HDLC SERPL-MCNC: 34 MG/DL (ref 40–60)
IMP & REVIEW OF LAB RESULTS: NORMAL
LDLC SERPL CALC-MCNC: 48 MG/DL (ref 0–100)
MICROALBUMIN UR-MCNC: 30.4 UG/ML
POTASSIUM SERPL-SCNC: 4.2 MMOL/L (ref 3.5–5.2)
PROT SERPL-MCNC: 6.7 G/DL (ref 6–8.5)
SODIUM SERPL-SCNC: 141 MMOL/L (ref 136–145)
TRIGL SERPL-MCNC: 203 MG/DL (ref 0–150)
VLDLC SERPL CALC-MCNC: 33 MG/DL (ref 5–40)

## 2025-04-01 RX ORDER — SEMAGLUTIDE 1.34 MG/ML
1 INJECTION, SOLUTION SUBCUTANEOUS WEEKLY
Qty: 9 ML | Refills: 0 | Status: SHIPPED | OUTPATIENT
Start: 2025-04-01

## 2025-04-01 NOTE — TELEPHONE ENCOUNTER
Rx Refill Note  Requested Prescriptions     Pending Prescriptions Disp Refills    Semaglutide, 1 MG/DOSE, (Ozempic, 1 MG/DOSE,) 4 MG/3ML solution pen-injector 9 mL 1     Sig: Inject 1 mg under the skin into the appropriate area as directed 1 (One) Time Per Week.      Last office visit with prescribing clinician: 3/17/2025   Last telemedicine visit with prescribing clinician: Visit date not found   Next office visit with prescribing clinician: 6/17/2025                         Would you like a call back once the refill request has been completed: [] Yes [] No    If the office needs to give you a call back, can they leave a voicemail: [] Yes [] No    Jaye Kelly  04/01/25, 08:13 EDT

## 2025-04-03 ENCOUNTER — PRIOR AUTHORIZATION (OUTPATIENT)
Dept: ENDOCRINOLOGY | Age: 50
End: 2025-04-03
Payer: COMMERCIAL

## 2025-04-04 NOTE — TELEPHONE ENCOUNTER
Request Reference Number: [id number]. OZEMPIC INJ 4MG/3ML is approved through 04/03/2026. Your patient may now fill this prescription and it will be covered.. Authorization Expiration Date: April 3, 2026.

## 2025-04-07 RX ORDER — GLIPIZIDE 10 MG/1
10 TABLET, FILM COATED, EXTENDED RELEASE ORAL DAILY
Qty: 30 TABLET | Refills: 0 | Status: SHIPPED | OUTPATIENT
Start: 2025-04-07

## 2025-04-07 NOTE — TELEPHONE ENCOUNTER
Rx Refill Note  Requested Prescriptions     Pending Prescriptions Disp Refills    glipizide (GLUCOTROL XL) 10 MG 24 hr tablet [Pharmacy Med Name: glipiZIDE XL 10 MG TABLET] 30 tablet 0     Sig: TAKE 1 TABLET BY MOUTH DAILY      Last office visit with prescribing clinician: 3/17/2025   Last telemedicine visit with prescribing clinician: Visit date not found   Next office visit with prescribing clinician: 6/17/2025                         Would you like a call back once the refill request has been completed: [] Yes [] No    If the office needs to give you a call back, can they leave a voicemail: [] Yes [] No    Jaye Kelly  04/07/25, 07:54 EDT

## 2025-04-30 DIAGNOSIS — E11.65 UNCONTROLLED TYPE 2 DIABETES MELLITUS WITH HYPERGLYCEMIA: ICD-10-CM

## 2025-04-30 NOTE — TELEPHONE ENCOUNTER
Rx Refill Note  Requested Prescriptions     Pending Prescriptions Disp Refills    Insulin Glargine (Lantus SoloStar) 100 UNIT/ML injection pen [Pharmacy Med Name: LANTUS SOLOSTAR 100 UNIT/ML] 15 mL 0     Sig: INJECT 56 UNITS UNDER THE SKIN AS DIRECTED DAILY      Last office visit with prescribing clinician: 3/17/2025   Last telemedicine visit with prescribing clinician: Visit date not found   Next office visit with prescribing clinician: 6/17/2025                         Would you like a call back once the refill request has been completed: [] Yes [] No    If the office needs to give you a call back, can they leave a voicemail: [] Yes [] No    Jaye Kelly  04/30/25, 14:32 EDT

## 2025-05-01 RX ORDER — INSULIN GLARGINE 100 [IU]/ML
INJECTION, SOLUTION SUBCUTANEOUS
Qty: 15 ML | Refills: 0 | Status: SHIPPED | OUTPATIENT
Start: 2025-05-01

## 2025-05-01 RX ORDER — GLIPIZIDE 10 MG/1
10 TABLET, FILM COATED, EXTENDED RELEASE ORAL DAILY
Qty: 30 TABLET | Refills: 1 | Status: SHIPPED | OUTPATIENT
Start: 2025-05-01

## 2025-05-01 NOTE — TELEPHONE ENCOUNTER
Rx Refill Note  Requested Prescriptions     Pending Prescriptions Disp Refills    glipizide (GLUCOTROL XL) 10 MG 24 hr tablet 30 tablet 0     Sig: Take 1 tablet by mouth Daily.      Last office visit with prescribing clinician: 3/17/2025   Last telemedicine visit with prescribing clinician: Visit date not found   Next office visit with prescribing clinician: 6/17/2025                         Would you like a call back once the refill request has been completed: [] Yes [] No    If the office needs to give you a call back, can they leave a voicemail: [] Yes [] No    Malena Kelly MA  05/01/25, 09:25 EDT

## 2025-05-09 NOTE — TELEPHONE ENCOUNTER
Rx Refill Note  Requested Prescriptions     Pending Prescriptions Disp Refills    metFORMIN (GLUCOPHAGE) 1000 MG tablet [Pharmacy Med Name: METFORMIN HCL 1,000 MG TABLET] 30 tablet 3     Sig: TAKE 1 TABLET BY MOUTH DAILY WITH BREAKFAST      Last office visit with prescribing clinician: 3/17/2025   Last telemedicine visit with prescribing clinician: Visit date not found   Next office visit with prescribing clinician: 6/17/2025                         Would you like a call back once the refill request has been completed: [] Yes [] No    If the office needs to give you a call back, can they leave a voicemail: [] Yes [] No    Jaye Kelly  05/09/25, 07:49 EDT

## 2025-05-15 DIAGNOSIS — E78.5 HYPERLIPIDEMIA, UNSPECIFIED HYPERLIPIDEMIA TYPE: ICD-10-CM

## 2025-05-15 RX ORDER — EZETIMIBE 10 MG/1
10 TABLET ORAL DAILY
Qty: 90 TABLET | Refills: 0 | Status: SHIPPED | OUTPATIENT
Start: 2025-05-15

## 2025-05-15 RX ORDER — ROSUVASTATIN CALCIUM 40 MG/1
40 TABLET, COATED ORAL NIGHTLY
Qty: 90 TABLET | Refills: 0 | Status: SHIPPED | OUTPATIENT
Start: 2025-05-15

## 2025-05-15 NOTE — TELEPHONE ENCOUNTER
Rx Refill Note  Requested Prescriptions     Pending Prescriptions Disp Refills    rosuvastatin (CRESTOR) 40 MG tablet [Pharmacy Med Name: ROSUVASTATIN 40MG TABLETS] 90 tablet 1     Sig: TAKE 1 TABLET BY MOUTH EVERY NIGHT    ezetimibe (ZETIA) 10 MG tablet [Pharmacy Med Name: EZETIMIBE 10MG TABLETS] 90 tablet 1     Sig: TAKE 1 TABLET BY MOUTH DAILY. STOP GEMFIBROZIL AND. START EZETIMIBE 10 MG DAILY      Last office visit with prescribing clinician: 3/17/2025   Last telemedicine visit with prescribing clinician: Visit date not found   Next office visit with prescribing clinician: 6/17/2025                         Would you like a call back once the refill request has been completed: [] Yes [] No    If the office needs to give you a call back, can they leave a voicemail: [] Yes [] No    Jaye Kelly  05/15/25, 08:04 EDT

## 2025-05-27 DIAGNOSIS — E11.65 UNCONTROLLED TYPE 2 DIABETES MELLITUS WITH HYPERGLYCEMIA: ICD-10-CM

## 2025-05-27 RX ORDER — INSULIN GLARGINE 100 [IU]/ML
INJECTION, SOLUTION SUBCUTANEOUS
Qty: 15 ML | Refills: 0 | Status: SHIPPED | OUTPATIENT
Start: 2025-05-27

## 2025-05-27 NOTE — TELEPHONE ENCOUNTER
Rx Refill Note  Requested Prescriptions     Pending Prescriptions Disp Refills    Lantus SoloStar 100 UNIT/ML injection pen [Pharmacy Med Name: LANTUS SOLOSTAR 100 UNIT/ML] 15 mL 0     Sig: INJECT 56 UNITS UNDER THE SKIN AS DIRECTED DAILY      Last office visit with prescribing clinician: 3/17/2025   Last telemedicine visit with prescribing clinician: Visit date not found   Next office visit with prescribing clinician: 6/17/2025                         Would you like a call back once the refill request has been completed: [] Yes [] No    If the office needs to give you a call back, can they leave a voicemail: [] Yes [] No    Jaye Kelly  05/27/25, 08:17 EDT  Jaye Kelly  5/27/2025  08:17 EDT

## 2025-06-10 RX ORDER — GLIPIZIDE 10 MG/1
10 TABLET, FILM COATED, EXTENDED RELEASE ORAL DAILY
Qty: 30 TABLET | Refills: 0 | Status: SHIPPED | OUTPATIENT
Start: 2025-06-10

## 2025-06-10 NOTE — TELEPHONE ENCOUNTER
Rx Refill Note  Requested Prescriptions     Pending Prescriptions Disp Refills    glipizide (GLUCOTROL XL) 10 MG 24 hr tablet 30 tablet 1     Sig: Take 1 tablet by mouth Daily.      Last office visit with prescribing clinician: 3/17/2025   Last telemedicine visit with prescribing clinician: Visit date not found   Next office visit with prescribing clinician: 6/17/2025                         Would you like a call back once the refill request has been completed: [] Yes [] No    If the office needs to give you a call back, can they leave a voicemail: [] Yes [] No  Malena Kelly MA  6/10/2025  09:25 EDT

## 2025-06-24 DIAGNOSIS — E11.65 UNCONTROLLED TYPE 2 DIABETES MELLITUS WITH HYPERGLYCEMIA: ICD-10-CM

## 2025-06-24 RX ORDER — INSULIN GLARGINE 100 [IU]/ML
INJECTION, SOLUTION SUBCUTANEOUS
Qty: 15 ML | Refills: 0 | Status: SHIPPED | OUTPATIENT
Start: 2025-06-24

## 2025-06-24 NOTE — TELEPHONE ENCOUNTER
Rx Refill Note  Requested Prescriptions     Pending Prescriptions Disp Refills    Lantus SoloStar 100 UNIT/ML injection pen [Pharmacy Med Name: LANTUS SOLOSTAR 100 UNIT/ML] 15 mL 0     Sig: INJECT 56 UNITS UNDER THE SKIN DAILY AS DIRECTED      Last office visit with prescribing clinician: 3/17/2025   Last telemedicine visit with prescribing clinician: Visit date not found   Next office visit with prescribing clinician: 7/17/2025                         Would you like a call back once the refill request has been completed: [] Yes [] No    If the office needs to give you a call back, can they leave a voicemail: [] Yes [] No    Jaye Kelly  06/24/25, 08:02 EDT  Jaye Kelly  6/24/2025  08:02 EDT

## 2025-07-07 DIAGNOSIS — E78.5 HYPERLIPIDEMIA, UNSPECIFIED HYPERLIPIDEMIA TYPE: ICD-10-CM

## 2025-07-07 RX ORDER — ROSUVASTATIN CALCIUM 40 MG/1
40 TABLET, COATED ORAL NIGHTLY
Qty: 30 TABLET | Refills: 0 | OUTPATIENT
Start: 2025-07-07

## 2025-07-07 RX ORDER — EZETIMIBE 10 MG/1
TABLET ORAL
Qty: 30 TABLET | Refills: 0 | OUTPATIENT
Start: 2025-07-07

## 2025-07-07 RX ORDER — SEMAGLUTIDE 1.34 MG/ML
1 INJECTION, SOLUTION SUBCUTANEOUS WEEKLY
Qty: 3 ML | Refills: 0 | Status: SHIPPED | OUTPATIENT
Start: 2025-07-07

## 2025-07-07 NOTE — TELEPHONE ENCOUNTER
Rx Refill Note  Requested Prescriptions     Pending Prescriptions Disp Refills    Ozempic, 1 MG/DOSE, 4 MG/3ML solution pen-injector [Pharmacy Med Name: OZEMPIC 1 MG/DOSE (4 MG/3 ML)] 3 mL      Sig: DIAL AND INJECT UNDER THE SKIN 1 MG WEEKLY      Last office visit with prescribing clinician: Visit date not found   Last telemedicine visit with prescribing clinician: Visit date not found   Next office visit with prescribing clinician: Visit date not found                         Would you like a call back once the refill request has been completed: [] Yes [] No    If the office needs to give you a call back, can they leave a voicemail: [] Yes [] No  Malena Kelly MA  7/7/2025  07:50 EDT

## 2025-07-07 NOTE — TELEPHONE ENCOUNTER
Rx Refill Note  Requested Prescriptions     Pending Prescriptions Disp Refills    rosuvastatin (CRESTOR) 40 MG tablet [Pharmacy Med Name: ROSUVASTATIN CALCIUM 40 MG TAB] 30 tablet 0     Sig: TAKE ONE TABLET BY MOUTH ONCE NIGHTLY    ezetimibe (ZETIA) 10 MG tablet [Pharmacy Med Name: EZETIMIBE 10 MG TABLET] 30 tablet 0     Sig: TAKE 1 TABLET BY MOUTH DAILY. STOP TAKING GEMFIBROZIL      Last office visit with prescribing clinician: 3/17/2025   Last telemedicine visit with prescribing clinician: Visit date not found   Next office visit with prescribing clinician: 7/17/2025                         Would you like a call back once the refill request has been completed: [] Yes [] No    If the office needs to give you a call back, can they leave a voicemail: [] Yes [] No    Jaye Kelly  07/07/25, 07:59 EDT  Jaye Kelly  7/7/2025  07:58 EDT

## 2025-07-08 RX ORDER — GLIPIZIDE 10 MG/1
10 TABLET, FILM COATED, EXTENDED RELEASE ORAL DAILY
Qty: 30 TABLET | Refills: 0 | Status: SHIPPED | OUTPATIENT
Start: 2025-07-08

## 2025-07-08 NOTE — TELEPHONE ENCOUNTER
Rx Refill Note  Requested Prescriptions     Pending Prescriptions Disp Refills    glipizide (GLUCOTROL XL) 10 MG 24 hr tablet 30 tablet 0     Sig: Take 1 tablet by mouth Daily.      Last office visit with prescribing clinician: 3/17/2025   Last telemedicine visit with prescribing clinician: Visit date not found   Next office visit with prescribing clinician: 7/17/2025                         Would you like a call back once the refill request has been completed: [] Yes [] No    If the office needs to give you a call back, can they leave a voicemail: [] Yes [] No  Malena Kelly MA  7/8/2025  09:37 EDT

## 2025-07-10 DIAGNOSIS — E78.5 HYPERLIPIDEMIA, UNSPECIFIED HYPERLIPIDEMIA TYPE: ICD-10-CM

## 2025-07-11 RX ORDER — ROSUVASTATIN CALCIUM 40 MG/1
40 TABLET, COATED ORAL NIGHTLY
Qty: 30 TABLET | Refills: 0 | Status: SHIPPED | OUTPATIENT
Start: 2025-07-11

## 2025-07-11 RX ORDER — EZETIMIBE 10 MG/1
TABLET ORAL
Qty: 30 TABLET | Refills: 0 | Status: SHIPPED | OUTPATIENT
Start: 2025-07-11

## 2025-07-11 NOTE — TELEPHONE ENCOUNTER
Rx Refill Note  Requested Prescriptions     Pending Prescriptions Disp Refills    rosuvastatin (CRESTOR) 40 MG tablet [Pharmacy Med Name: ROSUVASTATIN CALCIUM 40 MG TAB] 30 tablet 0     Sig: TAKE ONE TABLET BY MOUTH ONCE NIGHTLY    ezetimibe (ZETIA) 10 MG tablet [Pharmacy Med Name: EZETIMIBE 10 MG TABLET] 30 tablet 0     Sig: TAKE 1 TABLET BY MOUTH DAILY. STOP TAKING GEMFIBROZIL      Last office visit with prescribing clinician: 3/17/2025   Last telemedicine visit with prescribing clinician: Visit date not found   Next office visit with prescribing clinician: 7/17/2025                         Would you like a call back once the refill request has been completed: [] Yes [] No    If the office needs to give you a call back, can they leave a voicemail: [] Yes [] No    Jaye Kelly  07/11/25, 08:06 EDT

## 2025-07-22 DIAGNOSIS — E11.65 UNCONTROLLED TYPE 2 DIABETES MELLITUS WITH HYPERGLYCEMIA: ICD-10-CM

## 2025-07-22 RX ORDER — GLIPIZIDE 10 MG/1
10 TABLET, FILM COATED, EXTENDED RELEASE ORAL DAILY
Qty: 30 TABLET | Refills: 0 | Status: SHIPPED | OUTPATIENT
Start: 2025-07-22

## 2025-07-22 RX ORDER — INSULIN GLARGINE 100 [IU]/ML
56 INJECTION, SOLUTION SUBCUTANEOUS DAILY
Qty: 15 ML | Refills: 0 | Status: SHIPPED | OUTPATIENT
Start: 2025-07-22

## 2025-07-22 NOTE — TELEPHONE ENCOUNTER
Rx Refill Note  Requested Prescriptions     Pending Prescriptions Disp Refills    metFORMIN (GLUCOPHAGE) 1000 MG tablet 30 tablet 1     Sig: Take 1 tablet by mouth Daily With Breakfast.    Insulin Glargine (Lantus SoloStar) 100 UNIT/ML injection pen 15 mL 0    glipizide (GLUCOTROL XL) 10 MG 24 hr tablet 30 tablet 0     Sig: Take 1 tablet by mouth Daily.      Last office visit with prescribing clinician: 3/17/2025   Last telemedicine visit with prescribing clinician: Visit date not found   Next office visit with prescribing clinician: 8/11/2025                         Would you like a call back once the refill request has been completed: [] Yes [] No    If the office needs to give you a call back, can they leave a voicemail: [] Yes [] No    Jaye Kelly  07/22/25, 13:56 EDT  Jaye Kelly  7/22/2025  13:56 EDT

## 2025-08-11 ENCOUNTER — OFFICE VISIT (OUTPATIENT)
Dept: ENDOCRINOLOGY | Age: 50
End: 2025-08-11
Payer: COMMERCIAL

## 2025-08-11 VITALS
HEIGHT: 70 IN | SYSTOLIC BLOOD PRESSURE: 146 MMHG | HEART RATE: 96 BPM | DIASTOLIC BLOOD PRESSURE: 96 MMHG | WEIGHT: 249 LBS | OXYGEN SATURATION: 96 % | BODY MASS INDEX: 35.65 KG/M2 | TEMPERATURE: 98.2 F

## 2025-08-11 DIAGNOSIS — E11.65 UNCONTROLLED TYPE 2 DIABETES MELLITUS WITH HYPERGLYCEMIA: Primary | ICD-10-CM

## 2025-08-11 PROCEDURE — 99214 OFFICE O/P EST MOD 30 MIN: CPT | Performed by: NURSE PRACTITIONER

## 2025-08-11 RX ORDER — INSULIN GLARGINE 100 [IU]/ML
60 INJECTION, SOLUTION SUBCUTANEOUS DAILY
Qty: 18 ML | Refills: 3 | Status: SHIPPED | OUTPATIENT
Start: 2025-08-11

## 2025-08-11 RX ORDER — SEMAGLUTIDE 1.34 MG/ML
1 INJECTION, SOLUTION SUBCUTANEOUS WEEKLY
Qty: 3 ML | Refills: 2 | Status: SHIPPED | OUTPATIENT
Start: 2025-08-11

## 2025-08-12 ENCOUNTER — RESULTS FOLLOW-UP (OUTPATIENT)
Dept: ENDOCRINOLOGY | Age: 50
End: 2025-08-12
Payer: COMMERCIAL

## 2025-08-12 LAB
BUN SERPL-MCNC: 10 MG/DL (ref 6–24)
BUN/CREAT SERPL: 11 (ref 9–20)
CALCIUM SERPL-MCNC: 9 MG/DL (ref 8.7–10.2)
CHLORIDE SERPL-SCNC: 102 MMOL/L (ref 96–106)
CO2 SERPL-SCNC: 22 MMOL/L (ref 20–29)
CREAT SERPL-MCNC: 0.95 MG/DL (ref 0.76–1.27)
EGFRCR SERPLBLD CKD-EPI 2021: 97 ML/MIN/1.73
GLUCOSE SERPL-MCNC: 180 MG/DL (ref 70–99)
HBA1C MFR BLD: 10.4 % (ref 4.8–5.6)
POTASSIUM SERPL-SCNC: 4.3 MMOL/L (ref 3.5–5.2)
SODIUM SERPL-SCNC: 140 MMOL/L (ref 134–144)

## 2025-08-18 DIAGNOSIS — E78.5 HYPERLIPIDEMIA, UNSPECIFIED HYPERLIPIDEMIA TYPE: ICD-10-CM

## 2025-08-18 RX ORDER — ROSUVASTATIN CALCIUM 40 MG/1
40 TABLET, COATED ORAL NIGHTLY
Qty: 30 TABLET | Refills: 3 | Status: SHIPPED | OUTPATIENT
Start: 2025-08-18

## 2025-08-18 RX ORDER — EZETIMIBE 10 MG/1
TABLET ORAL
Qty: 30 TABLET | Refills: 3 | Status: SHIPPED | OUTPATIENT
Start: 2025-08-18

## 2025-08-25 RX ORDER — GLIPIZIDE 10 MG/1
10 TABLET, FILM COATED, EXTENDED RELEASE ORAL DAILY
Qty: 90 TABLET | Refills: 1 | Status: SHIPPED | OUTPATIENT
Start: 2025-08-25